# Patient Record
Sex: FEMALE | Race: WHITE | URBAN - METROPOLITAN AREA
[De-identification: names, ages, dates, MRNs, and addresses within clinical notes are randomized per-mention and may not be internally consistent; named-entity substitution may affect disease eponyms.]

---

## 2019-12-31 PROBLEM — Z00.00 ENCOUNTER FOR PREVENTIVE HEALTH EXAMINATION: Status: ACTIVE | Noted: 2019-12-31

## 2021-08-04 ENCOUNTER — OUTPATIENT (OUTPATIENT)
Dept: OUTPATIENT SERVICES | Facility: HOSPITAL | Age: 66
LOS: 1 days | Discharge: HOME | End: 2021-08-04
Payer: COMMERCIAL

## 2021-08-04 DIAGNOSIS — Z12.31 ENCOUNTER FOR SCREENING MAMMOGRAM FOR MALIGNANT NEOPLASM OF BREAST: ICD-10-CM

## 2021-08-04 PROCEDURE — 77063 BREAST TOMOSYNTHESIS BI: CPT | Mod: 26

## 2021-08-04 PROCEDURE — 77067 SCR MAMMO BI INCL CAD: CPT | Mod: 26

## 2022-03-31 ENCOUNTER — OUTPATIENT (OUTPATIENT)
Dept: OUTPATIENT SERVICES | Facility: HOSPITAL | Age: 67
LOS: 1 days | Discharge: HOME | End: 2022-03-31

## 2022-03-31 VITALS
WEIGHT: 199.96 LBS | TEMPERATURE: 97 F | DIASTOLIC BLOOD PRESSURE: 80 MMHG | HEART RATE: 69 BPM | OXYGEN SATURATION: 98 % | SYSTOLIC BLOOD PRESSURE: 161 MMHG | HEIGHT: 64 IN | RESPIRATION RATE: 17 BRPM

## 2022-03-31 VITALS
DIASTOLIC BLOOD PRESSURE: 80 MMHG | OXYGEN SATURATION: 98 % | RESPIRATION RATE: 18 BRPM | HEART RATE: 72 BPM | SYSTOLIC BLOOD PRESSURE: 129 MMHG

## 2022-03-31 DIAGNOSIS — Z98.891 HISTORY OF UTERINE SCAR FROM PREVIOUS SURGERY: Chronic | ICD-10-CM

## 2022-03-31 DIAGNOSIS — Z87.828 PERSONAL HISTORY OF OTHER (HEALED) PHYSICAL INJURY AND TRAUMA: Chronic | ICD-10-CM

## 2022-03-31 NOTE — ASU DISCHARGE PLAN (ADULT/PEDIATRIC) - NS MD DC FALL RISK RISK
For information on Fall & Injury Prevention, visit: [email or web address] OR  [email or web address] OR  https://www.cdc.gov/steadi/patient.html

## 2022-03-31 NOTE — PRE-ANESTHESIA EVALUATION ADULT - NSANTHOSAYNRD_GEN_A_CORE
No. YURIDIA screening performed.  STOP BANG Legend: 0-2 = LOW Risk; 3-4 = INTERMEDIATE Risk; 5-8 = HIGH Risk

## 2022-03-31 NOTE — ASU PATIENT PROFILE, ADULT - NSICDXPASTMEDICALHX_GEN_ALL_CORE_FT
PAST MEDICAL HISTORY:  Hyperlipidemia     Hypertension      PAST MEDICAL HISTORY:  Exposure to COVID-19 virus     Hyperlipidemia     Hypertension

## 2022-04-06 DIAGNOSIS — H25.11 AGE-RELATED NUCLEAR CATARACT, RIGHT EYE: ICD-10-CM

## 2022-04-06 DIAGNOSIS — K21.9 GASTRO-ESOPHAGEAL REFLUX DISEASE WITHOUT ESOPHAGITIS: ICD-10-CM

## 2022-04-06 DIAGNOSIS — I10 ESSENTIAL (PRIMARY) HYPERTENSION: ICD-10-CM

## 2022-04-07 ENCOUNTER — OUTPATIENT (OUTPATIENT)
Dept: OUTPATIENT SERVICES | Facility: HOSPITAL | Age: 67
LOS: 1 days | Discharge: HOME | End: 2022-04-07

## 2022-04-07 VITALS
HEART RATE: 68 BPM | DIASTOLIC BLOOD PRESSURE: 62 MMHG | HEIGHT: 64 IN | RESPIRATION RATE: 18 BRPM | SYSTOLIC BLOOD PRESSURE: 141 MMHG | TEMPERATURE: 98 F | OXYGEN SATURATION: 99 % | WEIGHT: 199.96 LBS

## 2022-04-07 VITALS
DIASTOLIC BLOOD PRESSURE: 71 MMHG | OXYGEN SATURATION: 99 % | HEART RATE: 71 BPM | SYSTOLIC BLOOD PRESSURE: 150 MMHG | RESPIRATION RATE: 18 BRPM

## 2022-04-07 DIAGNOSIS — Z98.891 HISTORY OF UTERINE SCAR FROM PREVIOUS SURGERY: Chronic | ICD-10-CM

## 2022-04-07 DIAGNOSIS — Z87.828 PERSONAL HISTORY OF OTHER (HEALED) PHYSICAL INJURY AND TRAUMA: Chronic | ICD-10-CM

## 2022-04-07 RX ORDER — LANSOPRAZOLE 15 MG/1
40 CAPSULE, DELAYED RELEASE ORAL
Qty: 0 | Refills: 0 | DISCHARGE

## 2022-04-07 RX ORDER — LOSARTAN POTASSIUM 100 MG/1
1 TABLET, FILM COATED ORAL
Qty: 0 | Refills: 0 | DISCHARGE

## 2022-04-07 NOTE — ASU PATIENT PROFILE, ADULT - FALL HARM RISK - UNIVERSAL INTERVENTIONS
Bed in lowest position, wheels locked, appropriate side rails in place/Call bell, personal items and telephone in reach/Instruct patient to call for assistance before getting out of bed or chair/Non-slip footwear when patient is out of bed/[place] to call system/Physically safe environment - no spills, clutter or unnecessary equipment/Purposeful Proactive Rounding/Room/bathroom lighting operational, light cord in reach

## 2022-04-07 NOTE — PACU DISCHARGE NOTE - AIRWAY PATENCY:
[de-identified] : Patient is a 52 year female, with PMH right breast cancer and BRCA1 mutation diagnosed in 2018 s/p b/l mastectomy and breast implants, ANIVAL/BSO, who presents for colon cancer screening. Patient has not had a colonoscopy in the past. Patient unsure about family h/o colon polyps or colon cancer. \par \par Patient found to have iron deficiency with hematology and referred to GI for evaluation. Hgb stable at 14.3. \par \par Patient overall feeling well, she does mention constipation which is worse since starting iron oral supplement. She is currently taking iron orally QOD instead of QD with some relief of constipation. She has some faint bright red blood when she wipes, particularly when straining. \par \par Patient denies pyrosis, dysphagia, nausea, vomiting, or unexplained weight loss. \par \par Patient denies any significant cardiac or pulmonary conditions.\par \par Recent labs done by hematology \par  Satisfactory

## 2022-04-13 DIAGNOSIS — Z98.41 CATARACT EXTRACTION STATUS, RIGHT EYE: ICD-10-CM

## 2022-04-13 DIAGNOSIS — E78.5 HYPERLIPIDEMIA, UNSPECIFIED: ICD-10-CM

## 2022-04-13 DIAGNOSIS — I10 ESSENTIAL (PRIMARY) HYPERTENSION: ICD-10-CM

## 2022-04-13 DIAGNOSIS — H26.8 OTHER SPECIFIED CATARACT: ICD-10-CM

## 2022-04-13 DIAGNOSIS — K21.9 GASTRO-ESOPHAGEAL REFLUX DISEASE WITHOUT ESOPHAGITIS: ICD-10-CM

## 2022-04-13 DIAGNOSIS — Z96.1 PRESENCE OF INTRAOCULAR LENS: ICD-10-CM

## 2022-06-28 PROBLEM — I10 ESSENTIAL (PRIMARY) HYPERTENSION: Chronic | Status: ACTIVE | Noted: 2022-03-31

## 2022-06-28 PROBLEM — Z20.822 CONTACT WITH AND (SUSPECTED) EXPOSURE TO COVID-19: Chronic | Status: ACTIVE | Noted: 2022-03-31

## 2022-06-28 PROBLEM — E78.5 HYPERLIPIDEMIA, UNSPECIFIED: Chronic | Status: ACTIVE | Noted: 2022-03-31

## 2022-07-05 ENCOUNTER — APPOINTMENT (OUTPATIENT)
Dept: ORTHOPEDIC SURGERY | Facility: CLINIC | Age: 67
End: 2022-07-05

## 2022-07-07 ENCOUNTER — APPOINTMENT (OUTPATIENT)
Dept: ORTHOPEDIC SURGERY | Facility: CLINIC | Age: 67
End: 2022-07-07

## 2023-03-01 ENCOUNTER — APPOINTMENT (OUTPATIENT)
Dept: ORTHOPEDIC SURGERY | Facility: CLINIC | Age: 68
End: 2023-03-01

## 2023-03-17 ENCOUNTER — APPOINTMENT (OUTPATIENT)
Dept: ORTHOPEDIC SURGERY | Facility: CLINIC | Age: 68
End: 2023-03-17
Payer: COMMERCIAL

## 2023-03-17 DIAGNOSIS — M51.36 OTHER INTERVERTEBRAL DISC DEGENERATION, LUMBAR REGION: ICD-10-CM

## 2023-03-17 PROCEDURE — 99203 OFFICE O/P NEW LOW 30 MIN: CPT

## 2023-03-17 PROCEDURE — 72110 X-RAY EXAM L-2 SPINE 4/>VWS: CPT

## 2023-03-17 NOTE — PHYSICAL EXAM
[de-identified] : TTP midline spine and paraspinal musculature \par Strength                                         \par Hip flexor\par   Right: 5/5; Left: 5/5                             \par Knee extensor  \par   Right: 5/5; Left: 5/5                     \par Ankle dorsiflexion\par   Right: 5/5; Left: 5/5                  \par EHL        \par   Right: 5/5; Left: 5/5                                \par Ankle plantarflexion    \par   Right: 5/5; Left: 5/5\par \par Sensation\par L1\par   Right: 2/2; Left: 2/2\par L2\par   Right: 2/2; Left: 2/2\par L3\par   Right: 2/2; Left: 2/2\par L4\par   Right: 2/2; Left: 2/2\par L5\par   Right: 2/2; Left: 2/2\par S1\par   Right: 2/2; Left: 2/2\par \par Reflexes\par Patella\par   Right: 2+; Left 2+\par Achilles\par   Right: 2+; Left 2+\par Clonus\par  Right: absent; L: absent\par

## 2023-03-17 NOTE — DATA REVIEWED
[FreeTextEntry1] : I obtained and reviewed AP lateral flexion-extension lumbar x-rays.  There is L5-S1 disc degeneration.  No instability.

## 2023-03-17 NOTE — HISTORY OF PRESENT ILLNESS
[de-identified] : 67-year-old female presents with 1 month of low back pain.  Denies radiating down her legs.  Denies numbness or tingling anywhere.  Denies loss of bladder bowel appears worse in the mornings.  This happened when she was in [place].  She went to urgent care and they gave her prednisone and muscle relaxant.  Got better at first but then returned.  She has not done physical therapy.  
The patient is a 89y Female complaining of fall.

## 2023-03-17 NOTE — DISCUSSION/SUMMARY
[de-identified] : 67-year-old female with some degenerative disc disease.  I recommend anti-inflammatories and physical therapy.  Follow-up in 6 weeks if continued pain.

## 2023-04-20 ENCOUNTER — OUTPATIENT (OUTPATIENT)
Dept: OUTPATIENT SERVICES | Facility: HOSPITAL | Age: 68
LOS: 1 days | End: 2023-04-20
Payer: COMMERCIAL

## 2023-04-20 DIAGNOSIS — Z87.828 PERSONAL HISTORY OF OTHER (HEALED) PHYSICAL INJURY AND TRAUMA: Chronic | ICD-10-CM

## 2023-04-20 DIAGNOSIS — Z98.891 HISTORY OF UTERINE SCAR FROM PREVIOUS SURGERY: Chronic | ICD-10-CM

## 2023-04-20 DIAGNOSIS — R06.2 WHEEZING: ICD-10-CM

## 2023-04-20 PROCEDURE — 71046 X-RAY EXAM CHEST 2 VIEWS: CPT | Mod: 26

## 2023-04-20 PROCEDURE — 71046 X-RAY EXAM CHEST 2 VIEWS: CPT

## 2023-04-21 DIAGNOSIS — R06.2 WHEEZING: ICD-10-CM

## 2023-08-22 ENCOUNTER — OUTPATIENT (OUTPATIENT)
Dept: OUTPATIENT SERVICES | Facility: HOSPITAL | Age: 68
LOS: 1 days | End: 2023-08-22
Payer: COMMERCIAL

## 2023-08-22 DIAGNOSIS — Z12.31 ENCOUNTER FOR SCREENING MAMMOGRAM FOR MALIGNANT NEOPLASM OF BREAST: ICD-10-CM

## 2023-08-22 DIAGNOSIS — Z98.891 HISTORY OF UTERINE SCAR FROM PREVIOUS SURGERY: Chronic | ICD-10-CM

## 2023-08-22 DIAGNOSIS — Z87.828 PERSONAL HISTORY OF OTHER (HEALED) PHYSICAL INJURY AND TRAUMA: Chronic | ICD-10-CM

## 2023-08-22 PROCEDURE — 77063 BREAST TOMOSYNTHESIS BI: CPT | Mod: 26

## 2023-08-22 PROCEDURE — 77067 SCR MAMMO BI INCL CAD: CPT | Mod: 26

## 2023-08-22 PROCEDURE — 77063 BREAST TOMOSYNTHESIS BI: CPT

## 2023-08-22 PROCEDURE — 77067 SCR MAMMO BI INCL CAD: CPT

## 2023-08-23 ENCOUNTER — APPOINTMENT (OUTPATIENT)
Dept: SURGERY | Facility: CLINIC | Age: 68
End: 2023-08-23

## 2023-08-23 DIAGNOSIS — Z12.31 ENCOUNTER FOR SCREENING MAMMOGRAM FOR MALIGNANT NEOPLASM OF BREAST: ICD-10-CM

## 2023-09-15 ENCOUNTER — APPOINTMENT (OUTPATIENT)
Dept: SURGERY | Facility: CLINIC | Age: 68
End: 2023-09-15
Payer: COMMERCIAL

## 2023-09-15 VITALS
HEART RATE: 65 BPM | DIASTOLIC BLOOD PRESSURE: 80 MMHG | HEIGHT: 64 IN | SYSTOLIC BLOOD PRESSURE: 122 MMHG | BODY MASS INDEX: 33.8 KG/M2 | WEIGHT: 198 LBS | TEMPERATURE: 96.4 F | OXYGEN SATURATION: 99 %

## 2023-09-15 DIAGNOSIS — E78.5 HYPERLIPIDEMIA, UNSPECIFIED: ICD-10-CM

## 2023-09-15 DIAGNOSIS — I10 ESSENTIAL (PRIMARY) HYPERTENSION: ICD-10-CM

## 2023-09-15 DIAGNOSIS — K21.9 GASTRO-ESOPHAGEAL REFLUX DISEASE W/OUT ESOPHAGITIS: ICD-10-CM

## 2023-09-15 PROCEDURE — 21555 EXC NECK LES SC < 3 CM: CPT

## 2023-09-20 PROBLEM — I10 HTN (HYPERTENSION): Status: ACTIVE | Noted: 2023-09-15

## 2023-09-20 PROBLEM — E78.5 HYPERLIPIDEMIA: Status: ACTIVE | Noted: 2023-09-15

## 2023-09-20 PROBLEM — K21.9 GERD (GASTROESOPHAGEAL REFLUX DISEASE): Status: ACTIVE | Noted: 2023-09-15

## 2023-09-20 RX ORDER — MELOXICAM 15 MG/1
15 TABLET ORAL
Qty: 90 | Refills: 1 | Status: DISCONTINUED | COMMUNITY
Start: 2023-03-17 | End: 2023-09-20

## 2023-09-20 RX ORDER — LOSARTAN POTASSIUM 100 MG/1
TABLET, FILM COATED ORAL
Refills: 0 | Status: ACTIVE | COMMUNITY

## 2023-09-20 RX ORDER — PRAVASTATIN SODIUM 80 MG/1
TABLET ORAL
Refills: 0 | Status: ACTIVE | COMMUNITY

## 2023-09-20 RX ORDER — OLMESARTAN MEDOXOMIL AND HYDROCHLOROTHIAZIDE 40; 25 MG/1; MG/1
TABLET ORAL
Refills: 0 | Status: ACTIVE | COMMUNITY

## 2023-09-20 RX ORDER — OMEPRAZOLE 40 MG/1
40 CAPSULE, DELAYED RELEASE ORAL
Refills: 0 | Status: ACTIVE | COMMUNITY

## 2023-09-29 ENCOUNTER — APPOINTMENT (OUTPATIENT)
Dept: SURGERY | Facility: CLINIC | Age: 68
End: 2023-09-29
Payer: COMMERCIAL

## 2023-09-29 VITALS
HEART RATE: 74 BPM | OXYGEN SATURATION: 98 % | TEMPERATURE: 97.5 F | BODY MASS INDEX: 33.8 KG/M2 | HEIGHT: 64 IN | WEIGHT: 198 LBS | SYSTOLIC BLOOD PRESSURE: 122 MMHG | DIASTOLIC BLOOD PRESSURE: 70 MMHG

## 2023-09-29 DIAGNOSIS — B37.31 ACUTE CANDIDIASIS OF VULVA AND VAGINA: ICD-10-CM

## 2023-09-29 PROCEDURE — 99024 POSTOP FOLLOW-UP VISIT: CPT

## 2023-09-29 RX ORDER — FLUCONAZOLE 150 MG/1
150 TABLET ORAL
Qty: 1 | Refills: 1 | Status: ACTIVE | COMMUNITY
Start: 2023-09-29 | End: 1900-01-01

## 2023-10-05 ENCOUNTER — APPOINTMENT (OUTPATIENT)
Dept: SURGERY | Facility: CLINIC | Age: 68
End: 2023-10-05
Payer: COMMERCIAL

## 2023-10-05 VITALS
OXYGEN SATURATION: 95 % | DIASTOLIC BLOOD PRESSURE: 82 MMHG | BODY MASS INDEX: 33.8 KG/M2 | HEIGHT: 64 IN | HEART RATE: 100 BPM | WEIGHT: 198 LBS | SYSTOLIC BLOOD PRESSURE: 136 MMHG | TEMPERATURE: 97 F

## 2023-10-05 DIAGNOSIS — L72.3 SEBACEOUS CYST: ICD-10-CM

## 2023-10-05 PROCEDURE — 99024 POSTOP FOLLOW-UP VISIT: CPT

## 2023-10-06 ENCOUNTER — APPOINTMENT (OUTPATIENT)
Dept: SURGERY | Facility: CLINIC | Age: 68
End: 2023-10-06

## 2023-10-10 ENCOUNTER — APPOINTMENT (OUTPATIENT)
Dept: ORTHOPEDIC SURGERY | Facility: CLINIC | Age: 68
End: 2023-10-10
Payer: COMMERCIAL

## 2023-10-10 VITALS — BODY MASS INDEX: 33.29 KG/M2 | HEIGHT: 64 IN | WEIGHT: 195 LBS

## 2023-10-10 DIAGNOSIS — M75.02 ADHESIVE CAPSULITIS OF LEFT SHOULDER: ICD-10-CM

## 2023-10-10 PROCEDURE — 73030 X-RAY EXAM OF SHOULDER: CPT | Mod: LT

## 2023-10-10 PROCEDURE — 20611 DRAIN/INJ JOINT/BURSA W/US: CPT | Mod: LT

## 2023-10-10 PROCEDURE — 99213 OFFICE O/P EST LOW 20 MIN: CPT | Mod: 25

## 2023-10-27 ENCOUNTER — APPOINTMENT (OUTPATIENT)
Dept: SURGERY | Facility: CLINIC | Age: 68
End: 2023-10-27
Payer: COMMERCIAL

## 2023-10-27 VITALS
TEMPERATURE: 96 F | SYSTOLIC BLOOD PRESSURE: 126 MMHG | HEART RATE: 91 BPM | DIASTOLIC BLOOD PRESSURE: 82 MMHG | WEIGHT: 195 LBS | BODY MASS INDEX: 33.29 KG/M2 | OXYGEN SATURATION: 97 % | HEIGHT: 64 IN

## 2023-10-27 DIAGNOSIS — L72.3 SEBACEOUS CYST: ICD-10-CM

## 2023-10-27 PROCEDURE — 99024 POSTOP FOLLOW-UP VISIT: CPT

## 2023-10-27 RX ORDER — CLINDAMYCIN HYDROCHLORIDE 150 MG/1
150 CAPSULE ORAL EVERY 6 HOURS
Qty: 28 | Refills: 0 | Status: DISCONTINUED | COMMUNITY
Start: 2023-09-29 | End: 2023-10-27

## 2023-11-03 ENCOUNTER — NON-APPOINTMENT (OUTPATIENT)
Age: 68
End: 2023-11-03

## 2023-11-17 ENCOUNTER — APPOINTMENT (OUTPATIENT)
Dept: SURGERY | Facility: CLINIC | Age: 68
End: 2023-11-17
Payer: COMMERCIAL

## 2023-11-17 VITALS
SYSTOLIC BLOOD PRESSURE: 124 MMHG | HEART RATE: 85 BPM | DIASTOLIC BLOOD PRESSURE: 80 MMHG | HEIGHT: 64 IN | BODY MASS INDEX: 32.44 KG/M2 | OXYGEN SATURATION: 98 % | WEIGHT: 190 LBS

## 2023-11-17 DIAGNOSIS — Z87.2 PERSONAL HISTORY OF DISEASES OF THE SKIN AND SUBCUTANEOUS TISSUE: ICD-10-CM

## 2023-11-17 PROCEDURE — 99024 POSTOP FOLLOW-UP VISIT: CPT

## 2023-12-11 ENCOUNTER — APPOINTMENT (OUTPATIENT)
Dept: ORTHOPEDIC SURGERY | Facility: CLINIC | Age: 68
End: 2023-12-11
Payer: COMMERCIAL

## 2023-12-11 VITALS — WEIGHT: 190 LBS | HEIGHT: 64 IN | BODY MASS INDEX: 32.44 KG/M2

## 2023-12-11 DIAGNOSIS — M75.02 ADHESIVE CAPSULITIS OF LEFT SHOULDER: ICD-10-CM

## 2023-12-11 PROCEDURE — 99213 OFFICE O/P EST LOW 20 MIN: CPT | Mod: 25

## 2023-12-11 PROCEDURE — 20611 DRAIN/INJ JOINT/BURSA W/US: CPT | Mod: LT

## 2024-01-05 ENCOUNTER — TRANSCRIPTION ENCOUNTER (OUTPATIENT)
Age: 69
End: 2024-01-05

## 2024-02-13 ENCOUNTER — APPOINTMENT (OUTPATIENT)
Dept: ORTHOPEDIC SURGERY | Facility: CLINIC | Age: 69
End: 2024-02-13

## 2024-04-26 ENCOUNTER — APPOINTMENT (OUTPATIENT)
Age: 69
End: 2024-04-26
Payer: COMMERCIAL

## 2024-04-26 ENCOUNTER — OUTPATIENT (OUTPATIENT)
Dept: OUTPATIENT SERVICES | Facility: HOSPITAL | Age: 69
LOS: 1 days | End: 2024-04-26
Payer: COMMERCIAL

## 2024-04-26 DIAGNOSIS — Z87.828 PERSONAL HISTORY OF OTHER (HEALED) PHYSICAL INJURY AND TRAUMA: Chronic | ICD-10-CM

## 2024-04-26 DIAGNOSIS — Z98.891 HISTORY OF UTERINE SCAR FROM PREVIOUS SURGERY: Chronic | ICD-10-CM

## 2024-04-26 DIAGNOSIS — R06.02 SHORTNESS OF BREATH: ICD-10-CM

## 2024-04-26 PROCEDURE — 94727 GAS DIL/WSHOT DETER LNG VOL: CPT | Mod: 26

## 2024-04-26 PROCEDURE — 94729 DIFFUSING CAPACITY: CPT | Mod: 26

## 2024-04-26 PROCEDURE — 94664 DEMO&/EVAL PT USE INHALER: CPT

## 2024-04-26 PROCEDURE — 94060 EVALUATION OF WHEEZING: CPT | Mod: 26

## 2024-04-26 PROCEDURE — 94729 DIFFUSING CAPACITY: CPT

## 2024-04-26 PROCEDURE — 94727 GAS DIL/WSHOT DETER LNG VOL: CPT

## 2024-04-26 PROCEDURE — 94070 EVALUATION OF WHEEZING: CPT

## 2024-04-27 DIAGNOSIS — R06.02 SHORTNESS OF BREATH: ICD-10-CM

## 2024-07-09 ENCOUNTER — OUTPATIENT (OUTPATIENT)
Dept: OUTPATIENT SERVICES | Facility: HOSPITAL | Age: 69
LOS: 1 days | End: 2024-07-09
Payer: COMMERCIAL

## 2024-07-09 DIAGNOSIS — I25.10 ATHEROSCLEROTIC HEART DISEASE OF NATIVE CORONARY ARTERY WITHOUT ANGINA PECTORIS: ICD-10-CM

## 2024-07-09 DIAGNOSIS — Z00.8 ENCOUNTER FOR OTHER GENERAL EXAMINATION: ICD-10-CM

## 2024-07-09 DIAGNOSIS — Z87.828 PERSONAL HISTORY OF OTHER (HEALED) PHYSICAL INJURY AND TRAUMA: Chronic | ICD-10-CM

## 2024-07-09 DIAGNOSIS — Z98.891 HISTORY OF UTERINE SCAR FROM PREVIOUS SURGERY: Chronic | ICD-10-CM

## 2024-07-09 PROCEDURE — 75574 CT ANGIO HRT W/3D IMAGE: CPT | Mod: 26

## 2024-07-09 PROCEDURE — 75574 CT ANGIO HRT W/3D IMAGE: CPT

## 2024-07-10 DIAGNOSIS — I25.10 ATHEROSCLEROTIC HEART DISEASE OF NATIVE CORONARY ARTERY WITHOUT ANGINA PECTORIS: ICD-10-CM

## 2024-07-23 ENCOUNTER — APPOINTMENT (OUTPATIENT)
Dept: PULMONOLOGY | Facility: CLINIC | Age: 69
End: 2024-07-23
Payer: COMMERCIAL

## 2024-07-23 VITALS
SYSTOLIC BLOOD PRESSURE: 130 MMHG | HEIGHT: 64 IN | BODY MASS INDEX: 34.15 KG/M2 | DIASTOLIC BLOOD PRESSURE: 78 MMHG | HEART RATE: 63 BPM | OXYGEN SATURATION: 99 % | WEIGHT: 200 LBS

## 2024-07-23 DIAGNOSIS — R05.3 CHRONIC COUGH: ICD-10-CM

## 2024-07-23 DIAGNOSIS — R91.1 SOLITARY PULMONARY NODULE: ICD-10-CM

## 2024-07-23 DIAGNOSIS — K21.9 GASTRO-ESOPHAGEAL REFLUX DISEASE W/OUT ESOPHAGITIS: ICD-10-CM

## 2024-07-23 PROCEDURE — 99204 OFFICE O/P NEW MOD 45 MIN: CPT

## 2024-07-23 RX ORDER — AZELASTINE HYDROCHLORIDE 137 UG/1
0.1 SPRAY, METERED NASAL
Refills: 0 | Status: ACTIVE | COMMUNITY

## 2024-07-23 RX ORDER — BUDESONIDE AND FORMOTEROL FUMARATE DIHYDRATE 160; 4.5 UG/1; UG/1
160-4.5 AEROSOL RESPIRATORY (INHALATION) TWICE DAILY
Qty: 1 | Refills: 1 | Status: ACTIVE | COMMUNITY
Start: 2024-07-23 | End: 1900-01-01

## 2024-07-23 RX ORDER — FLUTICASONE FUROATE, UMECLIDINIUM BROMIDE AND VILANTEROL TRIFENATATE 100; 62.5; 25 UG/1; UG/1; UG/1
100-62.5-25 POWDER RESPIRATORY (INHALATION)
Refills: 0 | Status: ACTIVE | COMMUNITY

## 2024-07-23 RX ORDER — PREDNISONE 20 MG/1
20 TABLET ORAL DAILY
Qty: 15 | Refills: 0 | Status: ACTIVE | COMMUNITY
Start: 2024-07-23 | End: 1900-01-01

## 2024-07-23 RX ORDER — FLUTICASONE PROPIONATE 50 UG/1
50 SPRAY, METERED NASAL TWICE DAILY
Qty: 1 | Refills: 1 | Status: ACTIVE | COMMUNITY
Start: 2024-07-23 | End: 1900-01-01

## 2024-07-23 NOTE — HISTORY OF PRESENT ILLNESS
[TextBox_4] : 68 year old female former smoker of 20 PY  Quit 20 years ago  Presenting for chronic cough  Cough was occurring throughout the day but now mostly at night  PFTs done: No obstruction, mild restriction likely related to body habitus, mild DLCO reduction that corrects to the VA  CCTA also done- 5 mm nodule - needs CT in 1 year given smoking history - no parenchymal dx noted  She has GERD, post nasal drip which can contribute - she feels post nasal drip is not controlled  She has HTN but is not on ACE inhibitors

## 2024-07-23 NOTE — REVIEW OF SYSTEMS
[Nasal Congestion] : nasal congestion [Postnasal Drip] : postnasal drip [Cough] : cough [Sputum] : sputum [Negative] : Endocrine [Dyspnea] : no dyspnea

## 2024-07-23 NOTE — ASSESSMENT
[FreeTextEntry1] : Chronic cough  GERD - well controlled  Possible asthma - Start Symbicort 160 BID - DC trelegy  Post nasal drip not controlled - continue Azelastine nasal spray - add Flonase BID  Prednisone also given  Recent CXR normal  PFTs with no obstruction; restriction likely body habitus related   5 mm lung nodule on CCTA  Former smoker  CT chest in 1 year   1 month follow up

## 2024-08-09 ENCOUNTER — APPOINTMENT (OUTPATIENT)
Dept: SLEEP CENTER | Facility: HOSPITAL | Age: 69
End: 2024-08-09

## 2024-08-09 ENCOUNTER — OUTPATIENT (OUTPATIENT)
Dept: OUTPATIENT SERVICES | Facility: HOSPITAL | Age: 69
LOS: 1 days | Discharge: HOME | End: 2024-08-09
Payer: COMMERCIAL

## 2024-08-09 DIAGNOSIS — G47.33 OBSTRUCTIVE SLEEP APNEA (ADULT) (PEDIATRIC): ICD-10-CM

## 2024-08-09 DIAGNOSIS — Z98.891 HISTORY OF UTERINE SCAR FROM PREVIOUS SURGERY: Chronic | ICD-10-CM

## 2024-08-09 DIAGNOSIS — Z87.828 PERSONAL HISTORY OF OTHER (HEALED) PHYSICAL INJURY AND TRAUMA: Chronic | ICD-10-CM

## 2024-08-09 PROCEDURE — 95800 SLP STDY UNATTENDED: CPT | Mod: 26

## 2024-08-09 PROCEDURE — 95800 SLP STDY UNATTENDED: CPT

## 2024-08-13 DIAGNOSIS — G47.33 OBSTRUCTIVE SLEEP APNEA (ADULT) (PEDIATRIC): ICD-10-CM

## 2024-08-14 ENCOUNTER — APPOINTMENT (OUTPATIENT)
Dept: PULMONOLOGY | Facility: CLINIC | Age: 69
End: 2024-08-14

## 2024-08-27 ENCOUNTER — APPOINTMENT (OUTPATIENT)
Dept: PULMONOLOGY | Facility: CLINIC | Age: 69
End: 2024-08-27
Payer: COMMERCIAL

## 2024-08-27 VITALS
WEIGHT: 200 LBS | RESPIRATION RATE: 14 BRPM | SYSTOLIC BLOOD PRESSURE: 120 MMHG | HEIGHT: 64 IN | OXYGEN SATURATION: 99 % | HEART RATE: 81 BPM | BODY MASS INDEX: 34.15 KG/M2 | DIASTOLIC BLOOD PRESSURE: 80 MMHG

## 2024-08-27 DIAGNOSIS — G47.33 OBSTRUCTIVE SLEEP APNEA (ADULT) (PEDIATRIC): ICD-10-CM

## 2024-08-27 DIAGNOSIS — R09.82 POSTNASAL DRIP: ICD-10-CM

## 2024-08-27 DIAGNOSIS — R91.1 SOLITARY PULMONARY NODULE: ICD-10-CM

## 2024-08-27 DIAGNOSIS — R05.3 CHRONIC COUGH: ICD-10-CM

## 2024-08-27 PROCEDURE — 99214 OFFICE O/P EST MOD 30 MIN: CPT

## 2024-08-27 PROCEDURE — G2211 COMPLEX E/M VISIT ADD ON: CPT | Mod: NC

## 2024-08-27 NOTE — ASSESSMENT
[FreeTextEntry1] : Chronic cough  GERD - well controlled  Possible HAAD - switch Symbicort to PRN  Post nasal drip - continue Azelastine BID - can use PRN  Recent CXR normal  PFTs with no obstruction; restriction likely body habitus related   5 mm lung nodule on CCTA  Former smoker  CT chest NC given  Not yet sone   Severe YURIDIA  APAP 6-18 ordered   3 months follow up

## 2024-08-27 NOTE — PHYSICAL EXAM
[No Acute Distress] : no acute distress [Normal Oropharynx] : normal oropharynx [Normal Appearance] : normal appearance [No Neck Mass] : no neck mass [Normal Rate/Rhythm] : normal rate/rhythm [Normal S1, S2] : normal s1, s2 [No Murmurs] : no murmurs [Wheeze] : wheeze [No Abnormalities] : no abnormalities [Benign] : benign [Normal Gait] : normal gait [No Clubbing] : no clubbing [No Cyanosis] : no cyanosis [No Edema] : no edema [FROM] : FROM [Normal Color/ Pigmentation] : normal color/ pigmentation [No Focal Deficits] : no focal deficits [Normal Affect] : normal affect [Oriented x3] : oriented x3

## 2024-08-27 NOTE — REVIEW OF SYSTEMS
[Postnasal Drip] : postnasal drip [Negative] : Endocrine [Nasal Congestion] : no nasal congestion [Cough] : no cough [Sputum] : no sputum [Dyspnea] : no dyspnea [TextBox_30] : resolved

## 2024-08-27 NOTE — HISTORY OF PRESENT ILLNESS
[TextBox_4] : 68 year old female former smoker of 20 PY  Quit 20 years ago  Presenting for chronic cough  Cough was occurring throughout the day but now mostly at night  PFTs done: No obstruction, mild restriction likely related to body habitus, mild DLCO reduction that corrects to the VA  CCTA also done- 5 mm nodule - needs CT in 1 year given smoking history - no parenchymal dx noted  She has GERD, post nasal drip which can contribute - she feels post nasal drip is not controlled  She has HTN but is not on ACE inhibitors   Cough resolved, no wheezing on exam today, PFTs and CT discussed; Severe YURIDIA on testing; APAP ordered.

## 2024-09-26 ENCOUNTER — RESULT REVIEW (OUTPATIENT)
Age: 69
End: 2024-09-26

## 2024-09-26 ENCOUNTER — OUTPATIENT (OUTPATIENT)
Dept: OUTPATIENT SERVICES | Facility: HOSPITAL | Age: 69
LOS: 1 days | Discharge: HOME | End: 2024-09-26
Payer: COMMERCIAL

## 2024-09-26 ENCOUNTER — INPATIENT (INPATIENT)
Facility: HOSPITAL | Age: 69
LOS: 2 days | Discharge: HOME | DRG: 862 | End: 2024-09-29
Attending: INTERNAL MEDICINE | Admitting: STUDENT IN AN ORGANIZED HEALTH CARE EDUCATION/TRAINING PROGRAM
Payer: COMMERCIAL

## 2024-09-26 VITALS
HEART RATE: 64 BPM | TEMPERATURE: 97 F | DIASTOLIC BLOOD PRESSURE: 77 MMHG | OXYGEN SATURATION: 99 % | SYSTOLIC BLOOD PRESSURE: 167 MMHG | HEIGHT: 64 IN | RESPIRATION RATE: 18 BRPM | WEIGHT: 199.96 LBS

## 2024-09-26 VITALS
RESPIRATION RATE: 18 BRPM | DIASTOLIC BLOOD PRESSURE: 78 MMHG | HEIGHT: 64 IN | TEMPERATURE: 99 F | OXYGEN SATURATION: 98 % | HEART RATE: 125 BPM | SYSTOLIC BLOOD PRESSURE: 143 MMHG | WEIGHT: 199.96 LBS

## 2024-09-26 VITALS
DIASTOLIC BLOOD PRESSURE: 64 MMHG | OXYGEN SATURATION: 98 % | SYSTOLIC BLOOD PRESSURE: 139 MMHG | HEART RATE: 78 BPM | RESPIRATION RATE: 18 BRPM

## 2024-09-26 DIAGNOSIS — Z98.891 HISTORY OF UTERINE SCAR FROM PREVIOUS SURGERY: Chronic | ICD-10-CM

## 2024-09-26 DIAGNOSIS — Z87.828 PERSONAL HISTORY OF OTHER (HEALED) PHYSICAL INJURY AND TRAUMA: Chronic | ICD-10-CM

## 2024-09-26 DIAGNOSIS — K29.00 ACUTE GASTRITIS WITHOUT BLEEDING: ICD-10-CM

## 2024-09-26 DIAGNOSIS — D13.0 BENIGN NEOPLASM OF ESOPHAGUS: ICD-10-CM

## 2024-09-26 LAB
BASOPHILS # BLD AUTO: 0.05 K/UL — SIGNIFICANT CHANGE UP (ref 0–0.2)
BASOPHILS NFR BLD AUTO: 0.4 % — SIGNIFICANT CHANGE UP (ref 0–1)
EOSINOPHIL # BLD AUTO: 0.04 K/UL — SIGNIFICANT CHANGE UP (ref 0–0.7)
EOSINOPHIL NFR BLD AUTO: 0.3 % — SIGNIFICANT CHANGE UP (ref 0–8)
HCT VFR BLD CALC: 36.1 % — LOW (ref 37–47)
HGB BLD-MCNC: 12 G/DL — SIGNIFICANT CHANGE UP (ref 12–16)
IMM GRANULOCYTES NFR BLD AUTO: 0.4 % — HIGH (ref 0.1–0.3)
LYMPHOCYTES # BLD AUTO: 0.85 K/UL — LOW (ref 1.2–3.4)
LYMPHOCYTES # BLD AUTO: 7 % — LOW (ref 20.5–51.1)
MCHC RBC-ENTMCNC: 29.3 PG — SIGNIFICANT CHANGE UP (ref 27–31)
MCHC RBC-ENTMCNC: 33.2 G/DL — SIGNIFICANT CHANGE UP (ref 32–37)
MCV RBC AUTO: 88.3 FL — SIGNIFICANT CHANGE UP (ref 81–99)
MONOCYTES # BLD AUTO: 0.51 K/UL — SIGNIFICANT CHANGE UP (ref 0.1–0.6)
MONOCYTES NFR BLD AUTO: 4.2 % — SIGNIFICANT CHANGE UP (ref 1.7–9.3)
NEUTROPHILS # BLD AUTO: 10.67 K/UL — HIGH (ref 1.4–6.5)
NEUTROPHILS NFR BLD AUTO: 87.7 % — HIGH (ref 42.2–75.2)
NRBC # BLD: 0 /100 WBCS — SIGNIFICANT CHANGE UP (ref 0–0)
PLATELET # BLD AUTO: 238 K/UL — SIGNIFICANT CHANGE UP (ref 130–400)
PMV BLD: 11.1 FL — HIGH (ref 7.4–10.4)
RBC # BLD: 4.09 M/UL — LOW (ref 4.2–5.4)
RBC # FLD: 12.4 % — SIGNIFICANT CHANGE UP (ref 11.5–14.5)
WBC # BLD: 12.17 K/UL — HIGH (ref 4.8–10.8)
WBC # FLD AUTO: 12.17 K/UL — HIGH (ref 4.8–10.8)

## 2024-09-26 PROCEDURE — 43251 EGD REMOVE LESION SNARE: CPT

## 2024-09-26 PROCEDURE — 43236 UPPR GI SCOPE W/SUBMUC INJ: CPT | Mod: XU

## 2024-09-26 PROCEDURE — C1889: CPT

## 2024-09-26 PROCEDURE — 88307 TISSUE EXAM BY PATHOLOGIST: CPT

## 2024-09-26 PROCEDURE — 88307 TISSUE EXAM BY PATHOLOGIST: CPT | Mod: 26

## 2024-09-26 PROCEDURE — 99291 CRITICAL CARE FIRST HOUR: CPT

## 2024-09-26 RX ORDER — SODIUM CHLORIDE 0.9 % (FLUSH) 0.9 %
1000 SYRINGE (ML) INJECTION ONCE
Refills: 0 | Status: COMPLETED | OUTPATIENT
Start: 2024-09-26 | End: 2024-09-26

## 2024-09-26 RX ORDER — MORPHINE SULFATE 30 MG/1
4 TABLET, FILM COATED, EXTENDED RELEASE ORAL ONCE
Refills: 0 | Status: DISCONTINUED | OUTPATIENT
Start: 2024-09-26 | End: 2024-09-26

## 2024-09-26 RX ORDER — FAMOTIDINE 40 MG
20 TABLET ORAL ONCE
Refills: 0 | Status: COMPLETED | OUTPATIENT
Start: 2024-09-26 | End: 2024-09-26

## 2024-09-26 RX ADMIN — Medication 1000 MILLILITER(S): at 23:17

## 2024-09-26 RX ADMIN — Medication 20 MILLIGRAM(S): at 23:16

## 2024-09-26 RX ADMIN — MORPHINE SULFATE 4 MILLIGRAM(S): 30 TABLET, FILM COATED, EXTENDED RELEASE ORAL at 23:17

## 2024-09-26 NOTE — H&P PST ADULT - ADMIT DATE
26-Sep-2024
The patient has been re-examined and I agree with the above assessment or I updated with my findings.

## 2024-09-26 NOTE — ED PROVIDER NOTE - PHYSICAL EXAMINATION
Constitutional: Uncomfortable appearing. Non toxic.   Eyes: PERRLA. Extraocular movements intact, no entrapment. Conjunctiva normal.   ENT: No nasal discharge. dry mucus membranes.  Neck: Supple, non tender, full range of motion.  CV: RRR no murmurs, rubs, or gallops. +S1S2.   Pulm: Clear to auscultation bilaterally. Normal work of breathing.  Abd: mildly distended, diffuse upper abdominal ttp. no rebound/guarding   Ext: Warm and well perfused x4, moving all extremities, no edema.   Psy: Cooperative, appropriate.   Skin: Warm, dry, no rash  Neuro: nonfocal

## 2024-09-26 NOTE — ED ADULT NURSE NOTE - NSFALLRISKINTERV_ED_ALL_ED

## 2024-09-26 NOTE — ED PROVIDER NOTE - CLINICAL SUMMARY MEDICAL DECISION MAKING FREE TEXT BOX
69-year-old female history of hypertension GERD dyslipidemia presenting for evaluation of lower abdominal pain.  States that she had EGD this morning, after returning home and eating soup developed diffuse upper abdominal pain.  No nausea vomiting.  No bright red blood per rectum or melena.  History of C-sections, no other abdominal surgeries. Labs imaging reviewed.  Patient subsequently became hypotensive, developed fever.  Cultures drawn, empiric antibiotics given.  Status post GYN evaluation.  GI consulted, recommend n.p.o., PPI, will evaluate in a.m.  Discussed with ICU, will admit to stepdown unit for further evaluation.

## 2024-09-26 NOTE — CHART NOTE - NSCHARTNOTEFT_GEN_A_CORE
PACU ANESTHESIA ADMISSION NOTE      Procedure:   Post op diagnosis:      ____  Intubated  TV:______       Rate: ______      FiO2: ______    __x__  Patent Airway    __x__  Full return of protective reflexes    __x__  Full recovery from anesthesia / back to baseline     Vitals:   T:  97.4         R: 20                 BP: 89/51                 Sat:  100                 P: 90      Mental Status:  __x__ Awake   ___x__ Alert   _____ Drowsy   _____ Sedated    Nausea/Vomiting:  _x___ NO  ______Yes,   See Post - Op Orders          Pain Scale (0-10):  _____    Treatment: __x__ None    ____ See Post - Op/PCA Orders    Post - Operative Fluids:   ____ Oral   ___x_ See Post - Op Orders    Plan: Discharge:   __x__Home       _____Floor     _____Critical Care    _____  Other:_________________    Comments:    Uneventful anesthesia. Patient transported to  spontaneously breathing and hemodynamically stable.

## 2024-09-26 NOTE — ASU DISCHARGE PLAN (ADULT/PEDIATRIC) - CARE PROVIDER_API CALL
Anabel Hernandez  Gastroenterology  [address] [place] [address]  Phone: [phone]  Fax: [phone]  Follow Up Time: 2 weeks

## 2024-09-26 NOTE — ASU PATIENT PROFILE, ADULT - FALL HARM RISK - UNIVERSAL INTERVENTIONS
Bed in lowest position, wheels locked, appropriate side rails in place/Call bell, personal items and telephone in reach/Instruct patient to call for assistance before getting out of bed or chair/Non-slip footwear when patient is out of bed/[place] to call system/Physically safe environment - no spills, clutter or unnecessary equipment/Purposeful Proactive Rounding/Room/bathroom lighting operational, light cord in reach

## 2024-09-26 NOTE — ED ADULT NURSE NOTE - OBJECTIVE STATEMENT
pt presented to ED c/o abdominal pain and heart burn s/p endoscopy procedure for poly removal. since procedure, pt has not been able to pass stool or gas. pt states she feels dehydrated. denies any chest pain or sob. pt feels warm to touch. A&Ox4. denies any bloody vomit or hematuria

## 2024-09-26 NOTE — ED PROVIDER NOTE - OBJECTIVE STATEMENT
69-year-old female history of hypertension GERD dyslipidemia presenting for evaluation of lower abdominal pain.  States that she had EGD this morning, after returning home and eating soup developed diffuse upper abdominal pain.  No nausea vomiting.  No bright red blood per rectum or melena.  History of C-sections, no other abdominal surgeries.

## 2024-09-27 ENCOUNTER — TRANSCRIPTION ENCOUNTER (OUTPATIENT)
Age: 69
End: 2024-09-27

## 2024-09-27 DIAGNOSIS — A41.9 SEPSIS, UNSPECIFIED ORGANISM: ICD-10-CM

## 2024-09-27 LAB
ABO RH CONFIRMATION: SIGNIFICANT CHANGE UP
ALBUMIN SERPL ELPH-MCNC: 3.6 G/DL — SIGNIFICANT CHANGE UP (ref 3.5–5.2)
ALBUMIN SERPL ELPH-MCNC: 4.1 G/DL — SIGNIFICANT CHANGE UP (ref 3.5–5.2)
ALP SERPL-CCNC: 102 U/L — SIGNIFICANT CHANGE UP (ref 30–115)
ALP SERPL-CCNC: 81 U/L — SIGNIFICANT CHANGE UP (ref 30–115)
ALT FLD-CCNC: 16 U/L — SIGNIFICANT CHANGE UP (ref 0–41)
ALT FLD-CCNC: 20 U/L — SIGNIFICANT CHANGE UP (ref 0–41)
ANION GAP SERPL CALC-SCNC: 11 MMOL/L — SIGNIFICANT CHANGE UP (ref 7–14)
ANION GAP SERPL CALC-SCNC: 12 MMOL/L — SIGNIFICANT CHANGE UP (ref 7–14)
ANION GAP SERPL CALC-SCNC: 15 MMOL/L — HIGH (ref 7–14)
APTT BLD: 30.8 SEC — SIGNIFICANT CHANGE UP (ref 27–39.2)
AST SERPL-CCNC: 23 U/L — SIGNIFICANT CHANGE UP (ref 0–41)
AST SERPL-CCNC: 30 U/L — SIGNIFICANT CHANGE UP (ref 0–41)
BASOPHILS # BLD AUTO: 0.05 K/UL — SIGNIFICANT CHANGE UP (ref 0–0.2)
BASOPHILS # BLD AUTO: 0.1 K/UL — SIGNIFICANT CHANGE UP (ref 0–0.2)
BASOPHILS NFR BLD AUTO: 0.4 % — SIGNIFICANT CHANGE UP (ref 0–1)
BASOPHILS NFR BLD AUTO: 0.6 % — SIGNIFICANT CHANGE UP (ref 0–1)
BILIRUB SERPL-MCNC: 0.5 MG/DL — SIGNIFICANT CHANGE UP (ref 0.2–1.2)
BILIRUB SERPL-MCNC: 0.7 MG/DL — SIGNIFICANT CHANGE UP (ref 0.2–1.2)
BLD GP AB SCN SERPL QL: SIGNIFICANT CHANGE UP
BUN SERPL-MCNC: 14 MG/DL — SIGNIFICANT CHANGE UP (ref 10–20)
BUN SERPL-MCNC: 16 MG/DL — SIGNIFICANT CHANGE UP (ref 10–20)
BUN SERPL-MCNC: 24 MG/DL — HIGH (ref 10–20)
CALCIUM SERPL-MCNC: 8.5 MG/DL — SIGNIFICANT CHANGE UP (ref 8.4–10.4)
CALCIUM SERPL-MCNC: 8.7 MG/DL — SIGNIFICANT CHANGE UP (ref 8.4–10.5)
CALCIUM SERPL-MCNC: 9.5 MG/DL — SIGNIFICANT CHANGE UP (ref 8.4–10.5)
CANCER AG125 SERPL-ACNC: 15 U/ML — SIGNIFICANT CHANGE UP
CANCER AG19-9 SERPL-ACNC: 25 U/ML — SIGNIFICANT CHANGE UP
CEA SERPL-MCNC: 1.9 NG/ML — SIGNIFICANT CHANGE UP (ref 0–3.8)
CHLORIDE SERPL-SCNC: 106 MMOL/L — SIGNIFICANT CHANGE UP (ref 98–110)
CHLORIDE SERPL-SCNC: 106 MMOL/L — SIGNIFICANT CHANGE UP (ref 98–110)
CHLORIDE SERPL-SCNC: 108 MMOL/L — SIGNIFICANT CHANGE UP (ref 98–110)
CO2 SERPL-SCNC: 20 MMOL/L — SIGNIFICANT CHANGE UP (ref 17–32)
CO2 SERPL-SCNC: 22 MMOL/L — SIGNIFICANT CHANGE UP (ref 17–32)
CO2 SERPL-SCNC: 23 MMOL/L — SIGNIFICANT CHANGE UP (ref 17–32)
CREAT SERPL-MCNC: 1.2 MG/DL — SIGNIFICANT CHANGE UP (ref 0.7–1.5)
CREAT SERPL-MCNC: 1.2 MG/DL — SIGNIFICANT CHANGE UP (ref 0.7–1.5)
CREAT SERPL-MCNC: 1.4 MG/DL — SIGNIFICANT CHANGE UP (ref 0.7–1.5)
EGFR: 41 ML/MIN/1.73M2 — LOW
EGFR: 50 ML/MIN/1.73M2 — LOW
EGFR: 50 ML/MIN/1.73M2 — LOW
EOSINOPHIL # BLD AUTO: 0.07 K/UL — SIGNIFICANT CHANGE UP (ref 0–0.7)
EOSINOPHIL # BLD AUTO: 0.13 K/UL — SIGNIFICANT CHANGE UP (ref 0–0.7)
EOSINOPHIL NFR BLD AUTO: 0.4 % — SIGNIFICANT CHANGE UP (ref 0–8)
EOSINOPHIL NFR BLD AUTO: 0.9 % — SIGNIFICANT CHANGE UP (ref 0–8)
GLUCOSE SERPL-MCNC: 104 MG/DL — HIGH (ref 70–99)
GLUCOSE SERPL-MCNC: 71 MG/DL — SIGNIFICANT CHANGE UP (ref 70–99)
GLUCOSE SERPL-MCNC: 90 MG/DL — SIGNIFICANT CHANGE UP (ref 70–99)
HCT VFR BLD CALC: 30.9 % — LOW (ref 37–47)
HCT VFR BLD CALC: 31 % — LOW (ref 37–47)
HGB BLD-MCNC: 10 G/DL — LOW (ref 12–16)
HGB BLD-MCNC: 10.1 G/DL — LOW (ref 12–16)
IMM GRANULOCYTES NFR BLD AUTO: 0.3 % — SIGNIFICANT CHANGE UP (ref 0.1–0.3)
IMM GRANULOCYTES NFR BLD AUTO: 0.5 % — HIGH (ref 0.1–0.3)
INR BLD: 1.14 RATIO — SIGNIFICANT CHANGE UP (ref 0.65–1.3)
LACTATE SERPL-SCNC: 1.4 MMOL/L — SIGNIFICANT CHANGE UP (ref 0.7–2)
LACTATE SERPL-SCNC: 1.6 MMOL/L — SIGNIFICANT CHANGE UP (ref 0.7–2)
LDH SERPL L TO P-CCNC: 163 — SIGNIFICANT CHANGE UP (ref 50–242)
LIDOCAIN IGE QN: 63 U/L — HIGH (ref 7–60)
LYMPHOCYTES # BLD AUTO: 17.3 % — LOW (ref 20.5–51.1)
LYMPHOCYTES # BLD AUTO: 2.37 K/UL — SIGNIFICANT CHANGE UP (ref 1.2–3.4)
LYMPHOCYTES # BLD AUTO: 21.1 % — SIGNIFICANT CHANGE UP (ref 20.5–51.1)
LYMPHOCYTES # BLD AUTO: 3.75 K/UL — HIGH (ref 1.2–3.4)
MAGNESIUM SERPL-MCNC: 1.5 MG/DL — LOW (ref 1.8–2.4)
MCHC RBC-ENTMCNC: 29.4 PG — SIGNIFICANT CHANGE UP (ref 27–31)
MCHC RBC-ENTMCNC: 29.5 PG — SIGNIFICANT CHANGE UP (ref 27–31)
MCHC RBC-ENTMCNC: 32.4 G/DL — SIGNIFICANT CHANGE UP (ref 32–37)
MCHC RBC-ENTMCNC: 32.6 G/DL — SIGNIFICANT CHANGE UP (ref 32–37)
MCV RBC AUTO: 90.6 FL — SIGNIFICANT CHANGE UP (ref 81–99)
MCV RBC AUTO: 90.9 FL — SIGNIFICANT CHANGE UP (ref 81–99)
MONOCYTES # BLD AUTO: 0.84 K/UL — HIGH (ref 0.1–0.6)
MONOCYTES # BLD AUTO: 1.34 K/UL — HIGH (ref 0.1–0.6)
MONOCYTES NFR BLD AUTO: 6.1 % — SIGNIFICANT CHANGE UP (ref 1.7–9.3)
MONOCYTES NFR BLD AUTO: 7.5 % — SIGNIFICANT CHANGE UP (ref 1.7–9.3)
NEUTROPHILS # BLD AUTO: 10.23 K/UL — HIGH (ref 1.4–6.5)
NEUTROPHILS # BLD AUTO: 12.45 K/UL — HIGH (ref 1.4–6.5)
NEUTROPHILS NFR BLD AUTO: 70.1 % — SIGNIFICANT CHANGE UP (ref 42.2–75.2)
NEUTROPHILS NFR BLD AUTO: 74.8 % — SIGNIFICANT CHANGE UP (ref 42.2–75.2)
NRBC # BLD: 0 /100 WBCS — SIGNIFICANT CHANGE UP (ref 0–0)
NRBC # BLD: 0 /100 WBCS — SIGNIFICANT CHANGE UP (ref 0–0)
PLATELET # BLD AUTO: 201 K/UL — SIGNIFICANT CHANGE UP (ref 130–400)
PLATELET # BLD AUTO: 216 K/UL — SIGNIFICANT CHANGE UP (ref 130–400)
PMV BLD: 11 FL — HIGH (ref 7.4–10.4)
PMV BLD: 11.2 FL — HIGH (ref 7.4–10.4)
POTASSIUM SERPL-MCNC: 3.9 MMOL/L — SIGNIFICANT CHANGE UP (ref 3.5–5)
POTASSIUM SERPL-MCNC: 4 MMOL/L — SIGNIFICANT CHANGE UP (ref 3.5–5)
POTASSIUM SERPL-MCNC: 4.3 MMOL/L — SIGNIFICANT CHANGE UP (ref 3.5–5)
POTASSIUM SERPL-SCNC: 3.9 MMOL/L — SIGNIFICANT CHANGE UP (ref 3.5–5)
POTASSIUM SERPL-SCNC: 4 MMOL/L — SIGNIFICANT CHANGE UP (ref 3.5–5)
POTASSIUM SERPL-SCNC: 4.3 MMOL/L — SIGNIFICANT CHANGE UP (ref 3.5–5)
PROT SERPL-MCNC: 5.7 G/DL — LOW (ref 6–8)
PROT SERPL-MCNC: 7 G/DL — SIGNIFICANT CHANGE UP (ref 6–8)
PROTHROM AB SERPL-ACNC: 13 SEC — HIGH (ref 9.95–12.87)
RBC # BLD: 3.4 M/UL — LOW (ref 4.2–5.4)
RBC # BLD: 3.42 M/UL — LOW (ref 4.2–5.4)
RBC # FLD: 12.7 % — SIGNIFICANT CHANGE UP (ref 11.5–14.5)
RBC # FLD: 12.8 % — SIGNIFICANT CHANGE UP (ref 11.5–14.5)
SODIUM SERPL-SCNC: 141 MMOL/L — SIGNIFICANT CHANGE UP (ref 135–146)
WBC # BLD: 13.69 K/UL — HIGH (ref 4.8–10.8)
WBC # BLD: 17.77 K/UL — HIGH (ref 4.8–10.8)
WBC # FLD AUTO: 13.69 K/UL — HIGH (ref 4.8–10.8)
WBC # FLD AUTO: 17.77 K/UL — HIGH (ref 4.8–10.8)

## 2024-09-27 PROCEDURE — 86304 IMMUNOASSAY TUMOR CA 125: CPT

## 2024-09-27 PROCEDURE — 71045 X-RAY EXAM CHEST 1 VIEW: CPT | Mod: 26

## 2024-09-27 PROCEDURE — 83735 ASSAY OF MAGNESIUM: CPT

## 2024-09-27 PROCEDURE — 86850 RBC ANTIBODY SCREEN: CPT

## 2024-09-27 PROCEDURE — 86301 IMMUNOASSAY TUMOR CA 19-9: CPT

## 2024-09-27 PROCEDURE — 99222 1ST HOSP IP/OBS MODERATE 55: CPT

## 2024-09-27 PROCEDURE — 84100 ASSAY OF PHOSPHORUS: CPT

## 2024-09-27 PROCEDURE — 85025 COMPLETE CBC W/AUTO DIFF WBC: CPT

## 2024-09-27 PROCEDURE — 74018 RADEX ABDOMEN 1 VIEW: CPT

## 2024-09-27 PROCEDURE — 82378 CARCINOEMBRYONIC ANTIGEN: CPT

## 2024-09-27 PROCEDURE — 99223 1ST HOSP IP/OBS HIGH 75: CPT

## 2024-09-27 PROCEDURE — 86900 BLOOD TYPING SEROLOGIC ABO: CPT

## 2024-09-27 PROCEDURE — 99232 SBSQ HOSP IP/OBS MODERATE 35: CPT

## 2024-09-27 PROCEDURE — 74177 CT ABD & PELVIS W/CONTRAST: CPT | Mod: 26,MC

## 2024-09-27 PROCEDURE — 80048 BASIC METABOLIC PNL TOTAL CA: CPT

## 2024-09-27 PROCEDURE — 86901 BLOOD TYPING SEROLOGIC RH(D): CPT

## 2024-09-27 PROCEDURE — 36415 COLL VENOUS BLD VENIPUNCTURE: CPT

## 2024-09-27 PROCEDURE — 76856 US EXAM PELVIC COMPLETE: CPT

## 2024-09-27 PROCEDURE — 84704 HCG FREE BETACHAIN TEST: CPT

## 2024-09-27 PROCEDURE — 80053 COMPREHEN METABOLIC PANEL: CPT

## 2024-09-27 RX ORDER — ENOXAPARIN SODIUM 150 MG/ML
40 INJECTION SUBCUTANEOUS EVERY 24 HOURS
Refills: 0 | Status: DISCONTINUED | OUTPATIENT
Start: 2024-09-27 | End: 2024-09-29

## 2024-09-27 RX ORDER — MAGNESIUM SULFATE 500 MG/ML
2 VIAL (ML) INJECTION
Refills: 0 | Status: COMPLETED | OUTPATIENT
Start: 2024-09-27 | End: 2024-09-27

## 2024-09-27 RX ORDER — PIPERACILLIN SODIUM AND TAZOBACTAM SODIUM 12; 1.5 G/60ML; G/60ML
3.38 INJECTION, POWDER, LYOPHILIZED, FOR SOLUTION INTRAVENOUS ONCE
Refills: 0 | Status: DISCONTINUED | OUTPATIENT
Start: 2024-09-27 | End: 2024-09-27

## 2024-09-27 RX ORDER — MORPHINE SULFATE 30 MG/1
4 TABLET, FILM COATED, EXTENDED RELEASE ORAL ONCE
Refills: 0 | Status: DISCONTINUED | OUTPATIENT
Start: 2024-09-27 | End: 2024-09-27

## 2024-09-27 RX ORDER — ACETAMINOPHEN 325 MG
650 TABLET ORAL EVERY 6 HOURS
Refills: 0 | Status: DISCONTINUED | OUTPATIENT
Start: 2024-09-27 | End: 2024-09-29

## 2024-09-27 RX ORDER — PANTOPRAZOLE SODIUM 40 MG/1
40 TABLET, DELAYED RELEASE ORAL
Refills: 0 | Status: DISCONTINUED | OUTPATIENT
Start: 2024-09-27 | End: 2024-09-29

## 2024-09-27 RX ORDER — NOREPINEPHRINE BITARTRATE/D5W 16MG/250ML
0.05 PLASTIC BAG, INJECTION (ML) INTRAVENOUS
Qty: 8 | Refills: 0 | Status: DISCONTINUED | OUTPATIENT
Start: 2024-09-27 | End: 2024-09-28

## 2024-09-27 RX ORDER — VANCOMYCIN HCL-SODIUM CHLORIDE IV SOLN 1.5 GM/250ML-0.9% 1.5-0.9/25 GM/ML-%
1250 SOLUTION INTRAVENOUS ONCE
Refills: 0 | Status: DISCONTINUED | OUTPATIENT
Start: 2024-09-27 | End: 2024-09-27

## 2024-09-27 RX ORDER — SODIUM CHLORIDE IRRIG SOLUTION 0.9 %
1000 SOLUTION, IRRIGATION IRRIGATION
Refills: 0 | Status: DISCONTINUED | OUTPATIENT
Start: 2024-09-27 | End: 2024-09-28

## 2024-09-27 RX ORDER — PANTOPRAZOLE SODIUM 40 MG/1
40 TABLET, DELAYED RELEASE ORAL ONCE
Refills: 0 | Status: COMPLETED | OUTPATIENT
Start: 2024-09-27 | End: 2024-09-27

## 2024-09-27 RX ORDER — SODIUM CHLORIDE IRRIG SOLUTION 0.9 %
1000 SOLUTION, IRRIGATION IRRIGATION ONCE
Refills: 0 | Status: COMPLETED | OUTPATIENT
Start: 2024-09-27 | End: 2024-09-27

## 2024-09-27 RX ORDER — ACETAMINOPHEN 325 MG
650 TABLET ORAL ONCE
Refills: 0 | Status: COMPLETED | OUTPATIENT
Start: 2024-09-27 | End: 2024-09-27

## 2024-09-27 RX ORDER — SODIUM CHLORIDE IRRIG SOLUTION 0.9 %
2000 SOLUTION, IRRIGATION IRRIGATION ONCE
Refills: 0 | Status: COMPLETED | OUTPATIENT
Start: 2024-09-27 | End: 2024-09-27

## 2024-09-27 RX ORDER — ONDANSETRON HCL/PF 4 MG/2 ML
4 VIAL (ML) INJECTION EVERY 8 HOURS
Refills: 0 | Status: DISCONTINUED | OUTPATIENT
Start: 2024-09-27 | End: 2024-09-29

## 2024-09-27 RX ORDER — VANCOMYCIN HCL-SODIUM CHLORIDE IV SOLN 1.5 GM/250ML-0.9% 1.5-0.9/25 GM/ML-%
1500 SOLUTION INTRAVENOUS ONCE
Refills: 0 | Status: COMPLETED | OUTPATIENT
Start: 2024-09-27 | End: 2024-09-27

## 2024-09-27 RX ORDER — PIPERACILLIN SODIUM AND TAZOBACTAM SODIUM 12; 1.5 G/60ML; G/60ML
3.38 INJECTION, POWDER, LYOPHILIZED, FOR SOLUTION INTRAVENOUS ONCE
Refills: 0 | Status: COMPLETED | OUTPATIENT
Start: 2024-09-27 | End: 2024-09-27

## 2024-09-27 RX ADMIN — Medication 4 MILLIGRAM(S): at 16:21

## 2024-09-27 RX ADMIN — Medication 2000 MILLILITER(S): at 05:30

## 2024-09-27 RX ADMIN — Medication 2000 MILLILITER(S): at 06:54

## 2024-09-27 RX ADMIN — Medication 25 GRAM(S): at 14:04

## 2024-09-27 RX ADMIN — MORPHINE SULFATE 4 MILLIGRAM(S): 30 TABLET, FILM COATED, EXTENDED RELEASE ORAL at 05:02

## 2024-09-27 RX ADMIN — Medication 100 MILLILITER(S): at 09:56

## 2024-09-27 RX ADMIN — Medication 8.5 MICROGRAM(S)/KG/MIN: at 06:11

## 2024-09-27 RX ADMIN — VANCOMYCIN HCL-SODIUM CHLORIDE IV SOLN 1.5 GM/250ML-0.9% 300 MILLIGRAM(S): 1.5-0.9/25 SOLUTION at 07:13

## 2024-09-27 RX ADMIN — Medication 2000 MILLILITER(S): at 04:14

## 2024-09-27 RX ADMIN — PANTOPRAZOLE SODIUM 40 MILLIGRAM(S): 40 TABLET, DELAYED RELEASE ORAL at 05:23

## 2024-09-27 RX ADMIN — Medication 25 GRAM(S): at 16:22

## 2024-09-27 RX ADMIN — PIPERACILLIN SODIUM AND TAZOBACTAM SODIUM 200 GRAM(S): 12; 1.5 INJECTION, POWDER, LYOPHILIZED, FOR SOLUTION INTRAVENOUS at 05:02

## 2024-09-27 RX ADMIN — Medication 650 MILLIGRAM(S): at 05:03

## 2024-09-27 RX ADMIN — Medication 650 MILLIGRAM(S): at 13:48

## 2024-09-27 NOTE — H&P ADULT - HISTORY OF PRESENT ILLNESS
69-year-old female history of hypertension GERD dyslipidemia presented to ED for abdominal pain yesterday evening s/p EGD in am.  No nausea vomiting.  No bright red blood per rectum or melena.     In the ED patient became hypotensive to 81/48 and recorded oral temp of 100.5 with hr 105 at 4am vital check   Sig Labs WBC 12.17 BUN/Scr 24/1.4  CTAP w/IV contrast   IMPRESSION:  1.  No definite evidence of acute abdominal pelvic pathology.  2.  Large midline cystic structure in the pelvis extending well into the   midabdomen as described above. Although exact etiology cannot be certain   based on CT favor ovarian and as described above possibly right ovarian.   Given the size of this lesion favor ovarian cystadenoma. No definite   complexity seen on CT. No wall thickening, complex septations or nodular   components. Appears homogeneously cystic. It measures approximately 11.8   x 10.4 x 16.7 cm. Gynecologic consultation would be advised which can be   done as an outpatient unless there are other clinical concerns.  3.  Additional incidental findings as above.      Seen by GYN with impression that these ct findings are incidental       In the ed given 4L fluid bolus, dose of  vancomycin and zosyn started on low dose levophed now weaning admitted to SDU for sepsis

## 2024-09-27 NOTE — CONSULT NOTE ADULT - ASSESSMENT
68yo P2 postmenopausal female with PMH GERD, HTN, HLD with sudden onset abdominal pain, with incidental finding of abdominal/pelvic mass, unlikely related to acute presentation.    -recommend tumor markers  -recommend pelvic ultrasound  -IVF resuscitation  -recommend GI consult  -recommend follow up with primary GYN as an outpatient for surgical planning       Discussed with Dr. Howell 70yo P2 postmenopausal female with PMH GERD, HTN, HLD with sudden onset abdominal pain, with incidental finding of abdominal/pelvic mass, unlikely related to acute presentation.    -recommend tumor markers  -recommend pelvic ultrasound  -IVF resuscitation  -recommend GI consult  -recommend follow up with primary GYN as an outpatient for surgical planning   -GYN will follow in house      Discussed with Dr. Howell

## 2024-09-27 NOTE — CONSULT NOTE ADULT - SUBJECTIVE AND OBJECTIVE BOX
PGY 4 Note    Chief Complaint: abdominal pain    HPI: 70yo P2 postmenopausal female with PMH GERD, HTN, HLD presents for sudden onset abdominal pain after eating this evening. States the pain is epigastric in origin. 10/10. Of note, patient had EGD today at [facility] and had the removal of polyp with clips. States pain started immediately after eating. Denies nausea or vomiting. Denies diarrhea or constipation. Reports months of early satiety and bloating. Denies weight loss. Endorses fevers in the ER Tmax 100.5F. Follows with Dr. Skelton for GYN care. Has not seen her recently.     Ob/Gyn History:  P2, LTCSx2              Denies history of ovarian cysts, uterine fibroids, abnormal paps, or STIs    Home Medications:  losartan 100 mg oral tablet: 1 tab(s) orally once a day (26 Sep 2024 16:17)  Prevacid: 40  orally (26 Sep 2024 16:17)    No Known Allergies    PAST MEDICAL & SURGICAL HISTORY:  Hypertension  Hyperlipidemia  Exposure to COVID-19 virus  History of torn meniscus of left knee  H/O  section    FAMILY HISTORY: denies family hx of endometrial, ovarian or colon cancer    SOCIAL HISTORY: approximately 6 year pack history. No longer smoking. Denies alcohol use, or illicit drug use    Vital Signs Last 24 Hrs  T(F): 100.5 (27 Sep 2024 04:07), Max: 100.5 (27 Sep 2024 04:07)  HR: 101 (27 Sep 2024 04:39) (64 - 125)  BP: 103/54 (27 Sep 2024 04:39) (81/48 - 167/77)  RR: 17 (27 Sep 2024 04:39) (16 - 19)    General Appearance - AAOx3, NAD  Heart - S1S2 regular rate and rhythm  Lung - CTA Bilaterally  Abdomen - Soft, nontender, nondistended, no rebound, no rigidity, no guarding, bowel sounds present    GYN/Pelvis:    Labia Majora - Normal  Labia Minora - Normal  Clitoris - Normal  Urethra - Normal  Vagina - Normal  Cervix - Normal    Uterus:  difficult to assess due to body habitus    Adnexa:  difficult to assess due to body habitus      Meds:   famotidine Injectable 20 milliGRAM(s) IV Push once  lactated ringers Bolus 2000 milliLiter(s) IV Bolus once  morphine  - Injectable 4 milliGRAM(s) IV Push once  sodium chloride 0.9% Bolus 1000 milliLiter(s) IV Bolus once    Height (cm): 162.6 (24 @ 22:05), 162.6 (24 @ 16:20)  Weight (kg): 90.7 (24 @ 22:05), 90.7 (24 @ 16:20)  BMI (kg/m2): 34.3 (24 @ 22:05), 34.3 (24 @ 16:20)  BSA (m2): 1.96 (24 @ 22:05), 1.96 (24 @ 16:20)    LABS:                        12.0   12.17 )-----------( 238      ( 26 Sep 2024 23:35 )             36.1             141  |  106  |  24[H]  ----------------------------<  104[H]  4.3   |  23  |  1.4    Ca    9.5      26 Sep 2024 23:35    TPro  7.0  /  Alb  4.1  /  TBili  0.5  /  DBili  x   /  AST  30  /  ALT  20  /  AlkPhos  102        Urinalysis Basic - ( 26 Sep 2024 23:35 )    Color: x / Appearance: x / SG: x / pH: x  Gluc: 104 mg/dL / Ketone: x  / Bili: x / Urobili: x   Blood: x / Protein: x / Nitrite: x   Leuk Esterase: x / RBC: x / WBC x   Sq Epi: x / Non Sq Epi: x / Bacteria: x      RADIOLOGY & ADDITIONAL STUDIES:  < from: CT Abdomen and Pelvis w/ IV Cont (24 @ 01:31) >  CT ABDOMEN AND PELVIS IC   ORDERED BY: JESSENIA HELM     PROCEDURE DATE:  2024          INTERPRETATION:  CLINICAL INFORMATION: Abdominal pain. Upper endoscopy   earlier the same day.    COMPARISON: None.    CONTRAST/COMPLICATIONS:  IV Contrast: Omnipaque 350  100 cc administered   0 cc discarded  Oral Contrast: NONE  Complications: None reported at time of study completion    PROCEDURE:  CT of the Abdomen and Pelvis was performed.  Sagittal and coronal reformats were performed.    FINDINGS:  LOWER CHEST: Patchy consolidated density in the lingula possibly   atelectasis or scarring. Infection cannot be entirely excluded. Small   type III combined hiatal hernia.    LIVER: Mild steatosis  BILE DUCTS: Normal caliber.  GALLBLADDER: Within normal limits.  SPLEEN: Within normal limits.  PANCREAS: Within normal limits.  ADRENALS: Within normal limits.  KIDNEYS/URETERS: Symmetric renal enhancement. No hydronephrosis    BLADDER: Moderately distended bladder was otherwise normal in appearance.  REPRODUCTIVE ORGANS: Midline large cystic structure abutting the uterine   fundus along its inferior aspect and extending well into the abdomen   superior to the umbilicus. Etiology not entirely certain though highly   favor ovarian origin as a completely cystic exophytic uterine fibroid in   otherwise normal appearing uterus would be unusual. Given the midline   location and no definite visualization of the ovaries is unclear which   side this is arising from however there is small suggestion that a normal   left adnexa can be visualized (series 3 image 110) therefore favor right   ovarian origin. Cystic structure is entirely homogeneous fluid   attenuation with no thickened wall, septations or nodular components   appreciated on CT. This measures approximately 11.8 x 10.4 x 16.7 cm    BOWEL: No bowel obstruction. Appendix is normal. Colonic diverticulosis   without evidence of acute diverticulitis. Linear densities noted in the   gastric pyloric/duodenal bulb region possibly representing endoscopic   clips given the history of EGD earlier the same day.  PERITONEUM/RETROPERITONEUM: Within normal limits.  VESSELS: Mild atherosclerosis. No abdominal aortic aneurysm.  LYMPH NODES: No lymphadenopathy.  ABDOMINAL WALL: Tiny fat-containing umbilical hernia.  BONES: Degenerative changes along the vertebral column. Small disc   osteophyte complex noted at L5-S1.      IMPRESSION:  1.  No definite evidence of acute abdominal pelvic pathology.  2.  Large midline cystic structure in the pelvis extending well into the   midabdomen as described above. Although exact etiology cannot be certain   based on CT favor ovarian and as described above possibly right ovarian.   Given the size of this lesion favor ovarian cystadenoma. No definite   complexity seen on CT. No wall thickening, complex septations or nodular   components. Appears homogeneously cystic. It measures approximately 11.8   x 10.4 x 16.7 cm. Gynecologic consultation would be advised which can be   done as anoutpatient unless there are other clinical concerns.  3.  Additional incidental findings as above.        --- End of Report ---              < end of copied text >   PGY 4 Note    Chief Complaint: abdominal pain    HPI: 70yo P2 postmenopausal female with PMH GERD, HTN, HLD presents for sudden onset abdominal pain after eating this evening. States the pain is epigastric in origin. 10/10. Of note, patient had EGD today at [facility] and had the removal of polyp with clips. States pain started immediately after eating. Was discharged around 7pm last night. Denies nausea or vomiting. Denies diarrhea or constipation. Reports months of early satiety and bloating. Denies weight loss. Endorses fevers in the ER Tmax 100.5F. Follows with Dr. Skelton for GYN care. Has not seen her recently.     Ob/Gyn History:  P2, LTCSx2              Denies history of ovarian cysts, uterine fibroids, abnormal paps, or STIs    Home Medications:  losartan 100 mg oral tablet: 1 tab(s) orally once a day (26 Sep 2024 16:17)  Prevacid: 40  orally (26 Sep 2024 16:17)    No Known Allergies    PAST MEDICAL & SURGICAL HISTORY:  Hypertension  Hyperlipidemia  Exposure to COVID-19 virus  History of torn meniscus of left knee  H/O  section    FAMILY HISTORY: denies family hx of endometrial, ovarian or colon cancer    SOCIAL HISTORY: approximately 6 year pack history. No longer smoking. Denies alcohol use, or illicit drug use    Vital Signs Last 24 Hrs  T(F): 100.5 (27 Sep 2024 04:07), Max: 100.5 (27 Sep 2024 04:07)  HR: 101 (27 Sep 2024 04:39) (64 - 125)  BP: 103/54 (27 Sep 2024 04:39) (81/48 - 167/77)  RR: 17 (27 Sep 2024 04:39) (16 - 19)    General Appearance - AAOx3, NAD  Heart - S1S2 regular rate and rhythm  Lung - CTA Bilaterally  Abdomen - Soft, nontender, nondistended, no rebound, no rigidity, no guarding, bowel sounds present    GYN/Pelvis:    Labia Majora - Normal  Labia Minora - Normal  Clitoris - Normal  Urethra - Normal  Vagina - Normal  Cervix - Normal    Uterus:  difficult to assess due to body habitus    Adnexa:  difficult to assess due to body habitus      Meds:   famotidine Injectable 20 milliGRAM(s) IV Push once  lactated ringers Bolus 2000 milliLiter(s) IV Bolus once  morphine  - Injectable 4 milliGRAM(s) IV Push once  sodium chloride 0.9% Bolus 1000 milliLiter(s) IV Bolus once    Height (cm): 162.6 (24 @ 22:05), 162.6 (24 @ 16:20)  Weight (kg): 90.7 (24 @ 22:05), 90.7 (24 @ 16:20)  BMI (kg/m2): 34.3 (24 @ 22:05), 34.3 (24 @ 16:20)  BSA (m2): 1.96 (24 @ 22:05), 1.96 (24 @ 16:20)    LABS:                        12.0   12.17 )-----------( 238      ( 26 Sep 2024 23:35 )             36.1             141  |  106  |  24[H]  ----------------------------<  104[H]  4.3   |  23  |  1.4    Ca    9.5      26 Sep 2024 23:35    TPro  7.0  /  Alb  4.1  /  TBili  0.5  /  DBili  x   /  AST  30  /  ALT  20  /  AlkPhos  102        Urinalysis Basic - ( 26 Sep 2024 23:35 )    Color: x / Appearance: x / SG: x / pH: x  Gluc: 104 mg/dL / Ketone: x  / Bili: x / Urobili: x   Blood: x / Protein: x / Nitrite: x   Leuk Esterase: x / RBC: x / WBC x   Sq Epi: x / Non Sq Epi: x / Bacteria: x      RADIOLOGY & ADDITIONAL STUDIES:  < from: CT Abdomen and Pelvis w/ IV Cont (24 @ 01:31) >  CT ABDOMEN AND PELVIS IC   ORDERED BY: JESSENIA HELM     PROCEDURE DATE:  2024          INTERPRETATION:  CLINICAL INFORMATION: Abdominal pain. Upper endoscopy   earlier the same day.    COMPARISON: None.    CONTRAST/COMPLICATIONS:  IV Contrast: Omnipaque 350  100 cc administered   0 cc discarded  Oral Contrast: NONE  Complications: None reported at time of study completion    PROCEDURE:  CT of the Abdomen and Pelvis was performed.  Sagittal and coronal reformats were performed.    FINDINGS:  LOWER CHEST: Patchy consolidated density in the lingula possibly   atelectasis or scarring. Infection cannot be entirely excluded. Small   type III combined hiatal hernia.    LIVER: Mild steatosis  BILE DUCTS: Normal caliber.  GALLBLADDER: Within normal limits.  SPLEEN: Within normal limits.  PANCREAS: Within normal limits.  ADRENALS: Within normal limits.  KIDNEYS/URETERS: Symmetric renal enhancement. No hydronephrosis    BLADDER: Moderately distended bladder was otherwise normal in appearance.  REPRODUCTIVE ORGANS: Midline large cystic structure abutting the uterine   fundus along its inferior aspect and extending well into the abdomen   superior to the umbilicus. Etiology not entirely certain though highly   favor ovarian origin as a completely cystic exophytic uterine fibroid in   otherwise normal appearing uterus would be unusual. Given the midline   location and no definite visualization of the ovaries is unclear which   side this is arising from however there is small suggestion that a normal   left adnexa can be visualized (series 3 image 110) therefore favor right   ovarian origin. Cystic structure is entirely homogeneous fluid   attenuation with no thickened wall, septations or nodular components   appreciated on CT. This measures approximately 11.8 x 10.4 x 16.7 cm    BOWEL: No bowel obstruction. Appendix is normal. Colonic diverticulosis   without evidence of acute diverticulitis. Linear densities noted in the   gastric pyloric/duodenal bulb region possibly representing endoscopic   clips given the history of EGD earlier the same day.  PERITONEUM/RETROPERITONEUM: Within normal limits.  VESSELS: Mild atherosclerosis. No abdominal aortic aneurysm.  LYMPH NODES: No lymphadenopathy.  ABDOMINAL WALL: Tiny fat-containing umbilical hernia.  BONES: Degenerative changes along the vertebral column. Small disc   osteophyte complex noted at L5-S1.      IMPRESSION:  1.  No definite evidence of acute abdominal pelvic pathology.  2.  Large midline cystic structure in the pelvis extending well into the   midabdomen as described above. Although exact etiology cannot be certain   based on CT favor ovarian and as described above possibly right ovarian.   Given the size of this lesion favor ovarian cystadenoma. No definite   complexity seen on CT. No wall thickening, complex septations or nodular   components. Appears homogeneously cystic. It measures approximately 11.8   x 10.4 x 16.7 cm. Gynecologic consultation would be advised which can be   done as anoutpatient unless there are other clinical concerns.  3.  Additional incidental findings as above.        --- End of Report ---              < end of copied text >

## 2024-09-27 NOTE — CONSULT NOTE ADULT - ASSESSMENT
69-year-old female history of hypertension GERD dyslipidemia presented to ED for abdominal pain yesterday evening s/p EGD for duodenal polyp EMR. She reports having chicken and soup for dinner which started severe lower abdominal pain with bloating with chills. Denies any nausea or vomiting. In ER she was febrile with soft BP.  CTAP no perforation or infection. She was briefly on levophed and was tapered off. Gi consulted for further evaluation. Currently she denies any pain.     #Severe lower abdominal pain likely constipation- improved  #SIRS on admission with possible bacterial translocation?  #s/p EGD with duodenal polyp EMR 9/26  - s/p EGD 9/26: Normal mucosa in the whole esophagus.Erythema in the stomach compatible with non-erosive gastritis.Hill Grade 4, Hiatal Hernia.A 2cm sessile duodenal polyp was seen in the proximal bulb. (Polypectomy, Endoclip).Normal mucosa in the Duodenal bulb and Second part of duodenum.  - Was briefly on levophed and febrile  - CTAP : as above  - Phy exam: benign abdomen, soft    RECS  - Recommend ambulation, if passing gas , will recommend to start clears and advance as tolerated  - Recommend bowel regimen  - Serial abdominal exams  - Recommend short antibiotic course  - Keep K >4, Mg >2  - Monitor WBC, trend fever curve  - We will follow

## 2024-09-27 NOTE — H&P ADULT - NSHPLABSRESULTS_GEN_ALL_CORE
.  LABS:                         12.0   12.17 )-----------( 238      ( 26 Sep 2024 23:35 )             36.1     09-26    141  |  106  |  24[H]  ----------------------------<  104[H]  4.3   |  23  |  1.4    Ca    9.5      26 Sep 2024 23:35    TPro  7.0  /  Alb  4.1  /  TBili  0.5  /  DBili  x   /  AST  30  /  ALT  20  /  AlkPhos  102  09-26    PT/INR - ( 27 Sep 2024 04:33 )   PT: 13.00 sec;   INR: 1.14 ratio         PTT - ( 27 Sep 2024 04:33 )  PTT:30.8 sec  Urinalysis Basic - ( 26 Sep 2024 23:35 )    Color: x / Appearance: x / SG: x / pH: x  Gluc: 104 mg/dL / Ketone: x  / Bili: x / Urobili: x   Blood: x / Protein: x / Nitrite: x   Leuk Esterase: x / RBC: x / WBC x   Sq Epi: x / Non Sq Epi: x / Bacteria: x            Lactate, Blood: 1.4 mmol/L (09-27 @ 04:33)  Lactate, Blood: 1.6 mmol/L (09-26 @ 23:35)      RADIOLOGY, EKG & ADDITIONAL TESTS: Reviewed.

## 2024-09-27 NOTE — H&P ADULT - ASSESSMENT
IMPRESSION:  #Gastroenteritis vs ileus post egd   #Sepsis POA  #Pelvic Mass, Incidental finding on ct  #Ho HTN     PLAN:    CNS: Avoid Sedatives    HEENT: Oral care    PULMONARY:  HOB @ 45 degrees. O2 Sat > 94%    CARDIOVASCULAR: MAP > 65. Hold home antihypertensives. Goal directed fluids avoid overload. Wean pressor.     GI: GI prophylaxis. CLD for now.  Bowel regimen.     RENAL:  Follow up lytes.  Correct as needed    INFECTIOUS DISEASE: Follow up cultures. Trend WBC. Zosyn for now.     HEMATOLOGICAL:  DVT prophylaxis. Send CEA CA-125, AFP, Inhibin A,B     ENDOCRINE:  Follow up FS.  Insulin protocol if needed.    MUSCULOSKELETAL: Increase as tolerated. Goal is ambulation today.     Dispo: SDU     IMPRESSION:  #Gastroenteritis vs ileus post egd   #SIRS present on admission, probably reactive.   #Pelvic Mass, Incidental finding on ct  #Ho HTN     PLAN:    CNS: Avoid Sedatives    HEENT: Oral care    PULMONARY:  HOB @ 45 degrees. O2 Sat > 94%    CARDIOVASCULAR: MAP > 65. Hold home antihypertensives. Goal directed fluids avoid overload. Wean pressor.     GI: GI prophylaxis. CLD for now.  Bowel regimen.     RENAL:  Follow up lytes.  Correct as needed    INFECTIOUS DISEASE: Follow up cultures. Trend WBC. Monitor off antibiotics fo now.     HEMATOLOGICAL:  DVT prophylaxis. Send CEA CA-125, AFP, Inhibin A,B     ENDOCRINE:  Follow up FS.  Insulin protocol if needed.    MUSCULOSKELETAL: Increase as tolerated. Goal is ambulation today.     Dispo: SDU consider downgrade when off pressors.      IMPRESSION:  #Gastroenteritis vs ileus post egd   #SIRS present on admission, probably reactive.   #Pelvic Mass, Incidental finding on ct  #Ho HTN     PLAN:    CNS: Avoid Sedatives    HEENT: Oral care    PULMONARY:  HOB @ 45 degrees. O2 Sat > 94%    CARDIOVASCULAR: MAP > 65. Hold home antihypertensives. Goal directed fluids avoid overload    GI: GI prophylaxis. CLD for now.  Bowel regimen.     RENAL:  Follow up lytes.  Correct as needed    INFECTIOUS DISEASE: Follow up cultures. Trend WBC. Monitor off antibiotics fo now.     HEMATOLOGICAL:  DVT prophylaxis. Send CEA CA-125, AFP, Inhibin A,B     ENDOCRINE:  Follow up FS.  Insulin protocol if needed.    MUSCULOSKELETAL: Increase as tolerated. Goal is ambulation today.     Dispo: SDU

## 2024-09-27 NOTE — DISCHARGE NOTE NURSING/CASE MANAGEMENT/SOCIAL WORK - NSDCPEFALRISK_GEN_ALL_CORE
For information on Fall & Injury Prevention, visit: [email or web address] OR  [email or web address] OR  https://www.cdc.gov/steadi/patient.html

## 2024-09-27 NOTE — H&P ADULT - NSHPPHYSICALEXAM_GEN_ALL_CORE
LOS:     VITALS:   T(C): 37 (09-27-24 @ 06:20), Max: 38.1 (09-27-24 @ 04:07)  HR: 75 (09-27-24 @ 09:58) (64 - 125)  BP: 117/59 (09-27-24 @ 09:58) (81/48 - 167/77)  RR: 16 (09-27-24 @ 09:58) (16 - 19)  SpO2: 95% (09-27-24 @ 09:58) (95% - 100%)    GENERAL: NAD, lying in bed comfortably  HEAD:  Atraumatic, Normocephalic  EYES: EOMI, PERRLA, conjunctiva and sclera clear  ENT: Moist mucous membranes  NECK: Supple, No JVD  CHEST/LUNG: Clear to auscultation bilaterally; No rales, rhonchi, wheezing, or rubs. Unlabored respirations  HEART: Regular rate and rhythm; No murmurs, rubs, or gallops  ABDOMEN: BSx4; Soft, mildly tender to deep palpation  EXTREMITIES:  2+ Peripheral Pulses, brisk capillary refill. No clubbing, cyanosis, or edema  NERVOUS SYSTEM:  A&Ox3, no focal deficits   SKIN: No rashes or lesions

## 2024-09-27 NOTE — ED ADULT NURSE REASSESSMENT NOTE - NS ED NURSE REASSESS COMMENT FT1
Received pt on stretcher, AAOX4, resp even and unlabored, pt denies abd pain , NAD noted, v/s updated. family at bedside, ongoing plan of care.

## 2024-09-27 NOTE — H&P ADULT - ATTENDING COMMENTS
As above  Possible Gastroenteritis vs ileus post egd  Likely sirs rxn  Monitor off abx  Advance diet  Monitor for clinical improvement  Anticipated DC in 24 HRS

## 2024-09-27 NOTE — CONSULT NOTE ADULT - SUBJECTIVE AND OBJECTIVE BOX
Gastroenterology Consultation:    Patient is a 69y old  Female who presents with a chief complaint of Gastroenteritis r/o sepsis (27 Sep 2024 09:51)        Admitted on: 24      HPI:  69-year-old female history of hypertension GERD dyslipidemia presented to ED for abdominal pain yesterday evening s/p EGD in am.  No nausea vomiting.  No bright red blood per rectum or melena.     In the ED patient became hypotensive to 81/48 and recorded oral temp of 100.5 with hr 105 at 4am vital check   Sig Labs WBC 12.17 BUN/Scr 24/1.4  CTAP w/IV contrast   IMPRESSION:  1.  No definite evidence of acute abdominal pelvic pathology.  2.  Large midline cystic structure in the pelvis extending well into the   midabdomen as described above. Although exact etiology cannot be certain   based on CT favor ovarian and as described above possibly right ovarian.   Given the size of this lesion favor ovarian cystadenoma. No definite   complexity seen on CT. No wall thickening, complex septations or nodular   components. Appears homogeneously cystic. It measures approximately 11.8   x 10.4 x 16.7 cm. Gynecologic consultation would be advised which can be   done as an outpatient unless there are other clinical concerns.  3.  Additional incidental findings as above.      Seen by GYN with impression that these ct findings are incidental       In the ed given 4L fluid bolus, dose of  vancomycin and zosyn started on low dose levophed now weaning admitted to SDU for sepsis  (27 Sep 2024 09:51)        Prior EGD:    Prior Colonoscopy:      PAST MEDICAL & SURGICAL HISTORY:  Hypertension      Hyperlipidemia      Exposure to COVID-19 virus      History of torn meniscus of left knee      H/O  section            FAMILY HISTORY:      Social History:  Tobacco:  Alcohol:  Drugs:    Home Medications:  Prevacid: 40  orally (26 Sep 2024 16:17)        MEDICATIONS  (STANDING):  enoxaparin Injectable 40 milliGRAM(s) SubCutaneous every 24 hours  lactated ringers. 1000 milliLiter(s) (100 mL/Hr) IV Continuous <Continuous>  norepinephrine Infusion 0.05 MICROgram(s)/kG/Min (8.5 mL/Hr) IV Continuous <Continuous>  pantoprazole    Tablet 40 milliGRAM(s) Oral before breakfast    MEDICATIONS  (PRN):      Allergies  No Known Allergies      Review of Systems:   Constitutional:  No Fever, No Chills  ENT/Mouth:  No Hearing Changes,  No Difficulty Swallowing  Eyes:  No Eye Pain, No Vision Changes  Cardiovascular:  No Chest Pain, No Palpitations  Respiratory:  No Cough, No Dyspnea  Gastrointestinal:  As described in HPI          Physical Examination:  T(C): 37 (24 @ 06:20), Max: 38.1 (24 @ 04:07)  HR: 71 (24 @ 11:37) (66 - 125)  BP: 131/59 (24 @ 11:37) (81/48 - 143/78)  RR: 17 (24 @ 11:37) (16 - 19)  SpO2: 99% (24 @ 11:37) (95% - 99%)  Height (cm): 162.6 (24 @ 05:39), 162.6 (24 @ 16:20)  Weight (kg): 90.7 (24 @ 22:05), 90.7 (24 @ 16:20)        GENERAL: AAOx3, no acute distress.  HEAD:  Atraumatic, Normocephalic  EYES: conjunctiva and sclera clear  CHEST/LUNG: Clear to auscultation bilaterally; No wheeze, rhonchi, or rales  HEART: Regular rate and rhythm; normal S1, S2, No murmurs.  ABDOMEN: Soft, nontender, nondistended; Bowel sounds present  SKIN: Intact, no jaundice        Data:                        10.0   17.77 )-----------( 216      ( 27 Sep 2024 11:19 )             30.9     Hgb Trend:  10.0  24 @ 11:19  12.0  24 @ 23:35          141  |  106  |  24[H]  ----------------------------<  104[H]  4.3   |  23  |  1.4    Ca    9.5      26 Sep 2024 23:35    TPro  7.0  /  Alb  4.1  /  TBili  0.5  /  DBili  x   /  AST  30  /  ALT  20  /  AlkPhos  102      Liver panel trend:  TBili 0.5   /   AST 30   /   ALT 20   /   AlkP 102   /   Tptn 7.0   /   Alb 4.1    /   DBili --            PT/INR - ( 27 Sep 2024 04:33 )   PT: 13.00 sec;   INR: 1.14 ratio         PTT - ( 27 Sep 2024 04:33 )  PTT:30.8 sec        Radiology:  CT Abdomen and Pelvis w/ IV Cont:   ACC: [id number] EXAM:  CT ABDOMEN AND PELVIS IC   ORDERED BY: JESSENIA HELM     PROCEDURE DATE:  2024          INTERPRETATION:  CLINICAL INFORMATION: Abdominal pain. Upper endoscopy   earlier the same day.    COMPARISON: None.    CONTRAST/COMPLICATIONS:  IV Contrast: Omnipaque 350  100 cc administered   0 cc discarded  Oral Contrast: NONE  Complications: None reported at time of study completion    PROCEDURE:  CT of the Abdomen and Pelvis was performed.  Sagittal and coronal reformats were performed.    FINDINGS:  LOWER CHEST: Patchy consolidated density in the lingula possibly   atelectasis or scarring. Infection cannot be entirely excluded. Small   type III combined hiatal hernia.    LIVER: Mild steatosis  BILE DUCTS: Normal caliber.  GALLBLADDER: Within normal limits.  SPLEEN: Within normal limits.  PANCREAS: Within normal limits.  ADRENALS: Within normal limits.  KIDNEYS/URETERS: Symmetric renal enhancement. No hydronephrosis    BLADDER: Moderately distended bladder was otherwise normal in appearance.  REPRODUCTIVE ORGANS: Midline large cystic structure abutting the uterine   fundus along its inferior aspect and extending well into the abdomen   superior to the umbilicus. Etiology not entirely certain though highly   favor ovarian origin as a completely cystic exophytic uterine fibroid in   otherwise normal appearing uterus would be unusual. Given the midline   location and no definite visualization of the ovaries is unclear which   side this is arising from however there is small suggestion that a normal   left adnexa can be visualized (series 3 image 110) therefore favor right   ovarian origin. Cystic structure is entirely homogeneous fluid   attenuation with no thickened wall, septations or nodular components   appreciated on CT. This measures approximately 11.8 x 10.4 x 16.7 cm    BOWEL: No bowel obstruction. Appendix is normal. Colonic diverticulosis   without evidence of acute diverticulitis. Linear densities noted in the   gastric pyloric/duodenal bulb region possibly representing endoscopic   clips given the history of EGD earlier the same day.  PERITONEUM/RETROPERITONEUM: Within normal limits.  VESSELS: Mild atherosclerosis. No abdominal aortic aneurysm.  LYMPH NODES: No lymphadenopathy.  ABDOMINAL WALL: Tiny fat-containing umbilical hernia.  BONES: Degenerative changes along the vertebral column. Small disc   osteophyte complex noted at L5-S1.      IMPRESSION:  1.  No definite evidence of acute abdominal pelvic pathology.  2.  Large midline cystic structure in the pelvis extending well into the   midabdomen as described above. Although exact etiology cannot be certain   based on CT favor ovarian and as described above possibly right ovarian.   Given the size of this lesion favor ovarian cystadenoma. No definite   complexity seen on CT. No wall thickening, complex septations or nodular   components. Appears homogeneously cystic. It measures approximately 11.8   x 10.4 x 16.7 cm. Gynecologic consultation would be advised which can be   done as anoutpatient unless there are other clinical concerns.  3.  Additional incidental findings as above.        --- End of Report ---            FILIBERTO EDWARDS MD; Attending Radiologist  This document has been electronically signed. Sep 27 2024  4:05AM (24 @ 01:31)       Gastroenterology Consultation:    Patient is a 69y old  Female who presents with a chief complaint of Gastroenteritis r/o sepsis (27 Sep 2024 09:51)        Admitted on: 24      HPI:  69-year-old female history of hypertension GERD dyslipidemia presented to ED for abdominal pain yesterday evening s/p EGD for duodenal polyp EMR. She reports having chicken and soup for dinner which started severe lower abdominal pain with bloating with chills. Denies any nausea or vomiting. In ER she was febrile with soft BP.  CTAP no perforation or infection. She was briefly on levophed and was tapered off. Gi consulted for further evaluation. Currently she denies any pain.   Prior EGD: : as below    Prior Colonoscopy:5 years ago with       PAST MEDICAL & SURGICAL HISTORY:  Hypertension      Hyperlipidemia      Exposure to COVID-19 virus      History of torn meniscus of left knee      H/O  section            FAMILY HISTORY:      Social History:  Tobacco:  Alcohol:  Drugs:    Home Medications:  Prevacid: 40  orally (26 Sep 2024 16:17)        MEDICATIONS  (STANDING):  enoxaparin Injectable 40 milliGRAM(s) SubCutaneous every 24 hours  lactated ringers. 1000 milliLiter(s) (100 mL/Hr) IV Continuous <Continuous>  norepinephrine Infusion 0.05 MICROgram(s)/kG/Min (8.5 mL/Hr) IV Continuous <Continuous>  pantoprazole    Tablet 40 milliGRAM(s) Oral before breakfast    MEDICATIONS  (PRN):      Allergies  No Known Allergies      Review of Systems:   Constitutional:  No Fever, No Chills  ENT/Mouth:  No Hearing Changes,  No Difficulty Swallowing  Eyes:  No Eye Pain, No Vision Changes  Cardiovascular:  No Chest Pain, No Palpitations  Respiratory:  No Cough, No Dyspnea  Gastrointestinal:  As described in HPI          Physical Examination:  T(C): 37 (24 @ 06:20), Max: 38.1 (24 @ 04:07)  HR: 71 (24 @ 11:37) (66 - 125)  BP: 131/59 (24 @ 11:37) (81/48 - 143/78)  RR: 17 (24 @ 11:37) (16 - 19)  SpO2: 99% (24 @ 11:37) (95% - 99%)  Height (cm): 162.6 (24 @ 05:39), 162.6 (24 @ 16:20)  Weight (kg): 90.7 (24 @ 22:05), 90.7 (24 @ 16:20)        GENERAL: AAOx3, no acute distress.  HEAD:  Atraumatic, Normocephalic  EYES: conjunctiva and sclera clear  CHEST/LUNG: Clear to auscultation bilaterally; No wheeze, rhonchi, or rales  HEART: Regular rate and rhythm; normal S1, S2, No murmurs.  ABDOMEN: Soft, nontender, nondistended; Bowel sounds present  SKIN: Intact, no jaundice        Data:                        10.0   17.77 )-----------( 216      ( 27 Sep 2024 11:19 )             30.9     Hgb Trend:  10.0  24 @ 11:19  12.0  24 @ 23:35          141  |  106  |  24[H]  ----------------------------<  104[H]  4.3   |  23  |  1.4    Ca    9.5      26 Sep 2024 23:35    TPro  7.0  /  Alb  4.1  /  TBili  0.5  /  DBili  x   /  AST  30  /  ALT  20  /  AlkPhos  102      Liver panel trend:  TBili 0.5   /   AST 30   /   ALT 20   /   AlkP 102   /   Tptn 7.0   /   Alb 4.1    /   DBili --            PT/INR - ( 27 Sep 2024 04:33 )   PT: 13.00 sec;   INR: 1.14 ratio         PTT - ( 27 Sep 2024 04:33 )  PTT:30.8 sec        Radiology:  CT Abdomen and Pelvis w/ IV Cont:   ACC: [id number] EXAM:  CT ABDOMEN AND PELVIS IC   ORDERED BY: JESSENIA HELM     PROCEDURE DATE:  2024          INTERPRETATION:  CLINICAL INFORMATION: Abdominal pain. Upper endoscopy   earlier the same day.    COMPARISON: None.    CONTRAST/COMPLICATIONS:  IV Contrast: Omnipaque 350  100 cc administered   0 cc discarded  Oral Contrast: NONE  Complications: None reported at time of study completion    PROCEDURE:  CT of the Abdomen and Pelvis was performed.  Sagittal and coronal reformats were performed.    FINDINGS:  LOWER CHEST: Patchy consolidated density in the lingula possibly   atelectasis or scarring. Infection cannot be entirely excluded. Small   type III combined hiatal hernia.    LIVER: Mild steatosis  BILE DUCTS: Normal caliber.  GALLBLADDER: Within normal limits.  SPLEEN: Within normal limits.  PANCREAS: Within normal limits.  ADRENALS: Within normal limits.  KIDNEYS/URETERS: Symmetric renal enhancement. No hydronephrosis    BLADDER: Moderately distended bladder was otherwise normal in appearance.  REPRODUCTIVE ORGANS: Midline large cystic structure abutting the uterine   fundus along its inferior aspect and extending well into the abdomen   superior to the umbilicus. Etiology not entirely certain though highly   favor ovarian origin as a completely cystic exophytic uterine fibroid in   otherwise normal appearing uterus would be unusual. Given the midline   location and no definite visualization of the ovaries is unclear which   side this is arising from however there is small suggestion that a normal   left adnexa can be visualized (series 3 image 110) therefore favor right   ovarian origin. Cystic structure is entirely homogeneous fluid   attenuation with no thickened wall, septations or nodular components   appreciated on CT. This measures approximately 11.8 x 10.4 x 16.7 cm    BOWEL: No bowel obstruction. Appendix is normal. Colonic diverticulosis   without evidence of acute diverticulitis. Linear densities noted in the   gastric pyloric/duodenal bulb region possibly representing endoscopic   clips given the history of EGD earlier the same day.  PERITONEUM/RETROPERITONEUM: Within normal limits.  VESSELS: Mild atherosclerosis. No abdominal aortic aneurysm.  LYMPH NODES: No lymphadenopathy.  ABDOMINAL WALL: Tiny fat-containing umbilical hernia.  BONES: Degenerative changes along the vertebral column. Small disc   osteophyte complex noted at L5-S1.      IMPRESSION:  1.  No definite evidence of acute abdominal pelvic pathology.  2.  Large midline cystic structure in the pelvis extending well into the   midabdomen as described above. Although exact etiology cannot be certain   based on CT favor ovarian and as described above possibly right ovarian.   Given the size of this lesion favor ovarian cystadenoma. No definite   complexity seen on CT. No wall thickening, complex septations or nodular   components. Appears homogeneously cystic. It measures approximately 11.8   x 10.4 x 16.7 cm. Gynecologic consultation would be advised which can be   done as anoutpatient unless there are other clinical concerns.  3.  Additional incidental findings as above.        --- End of Report ---            FILIBERTO EDWARDS MD; Attending Radiologist  This document has been electronically signed. Sep 27 2024  4:05AM (24 @ 01:31)

## 2024-09-27 NOTE — DISCHARGE NOTE NURSING/CASE MANAGEMENT/SOCIAL WORK - PATIENT PORTAL LINK FT
You can access the FollowMyHealth Patient Portal offered by [facility] by registering at the following website: [email or web address]. By joining [employer]’s FollowMyHealth portal, you will also be able to view your health information using other applications (apps) compatible with our system.

## 2024-09-27 NOTE — CONSULT NOTE ADULT - ATTENDING COMMENTS
pelvic mass  tumor markers  will follow
SP EGD EMR of a duodenal polyp admitted for abdominal pain and reported chills. Pain is in the lower abdomen, CT reviewed: no free air. Continue to monitor. IV hydration start clears monitor x24 hours and DC in the AM.

## 2024-09-27 NOTE — ED ADULT NURSE REASSESSMENT NOTE - NS ED NURSE REASSESS COMMENT FT1
Pt repeat BP is 88/45, MAR was notified, AS per MD will placed an order for levophed and will come and evaluate patient.

## 2024-09-28 ENCOUNTER — TRANSCRIPTION ENCOUNTER (OUTPATIENT)
Age: 69
End: 2024-09-28

## 2024-09-28 LAB
ALBUMIN SERPL ELPH-MCNC: 3.2 G/DL — LOW (ref 3.5–5.2)
ALP SERPL-CCNC: 74 U/L — SIGNIFICANT CHANGE UP (ref 30–115)
ALT FLD-CCNC: 13 U/L — SIGNIFICANT CHANGE UP (ref 0–41)
ANION GAP SERPL CALC-SCNC: 9 MMOL/L — SIGNIFICANT CHANGE UP (ref 7–14)
AST SERPL-CCNC: 16 U/L — SIGNIFICANT CHANGE UP (ref 0–41)
BASOPHILS # BLD AUTO: 0.04 K/UL — SIGNIFICANT CHANGE UP (ref 0–0.2)
BASOPHILS NFR BLD AUTO: 0.4 % — SIGNIFICANT CHANGE UP (ref 0–1)
BILIRUB SERPL-MCNC: 0.4 MG/DL — SIGNIFICANT CHANGE UP (ref 0.2–1.2)
BUN SERPL-MCNC: 10 MG/DL — SIGNIFICANT CHANGE UP (ref 10–20)
CALCIUM SERPL-MCNC: 8.7 MG/DL — SIGNIFICANT CHANGE UP (ref 8.4–10.5)
CHLORIDE SERPL-SCNC: 108 MMOL/L — SIGNIFICANT CHANGE UP (ref 98–110)
CO2 SERPL-SCNC: 22 MMOL/L — SIGNIFICANT CHANGE UP (ref 17–32)
CREAT SERPL-MCNC: 1.1 MG/DL — SIGNIFICANT CHANGE UP (ref 0.7–1.5)
EGFR: 55 ML/MIN/1.73M2 — LOW
EOSINOPHIL # BLD AUTO: 0.14 K/UL — SIGNIFICANT CHANGE UP (ref 0–0.7)
EOSINOPHIL NFR BLD AUTO: 1.5 % — SIGNIFICANT CHANGE UP (ref 0–8)
GLUCOSE SERPL-MCNC: 105 MG/DL — HIGH (ref 70–99)
HCT VFR BLD CALC: 29.1 % — LOW (ref 37–47)
HGB BLD-MCNC: 9.8 G/DL — LOW (ref 12–16)
IMM GRANULOCYTES NFR BLD AUTO: 0.3 % — SIGNIFICANT CHANGE UP (ref 0.1–0.3)
LYMPHOCYTES # BLD AUTO: 2.19 K/UL — SIGNIFICANT CHANGE UP (ref 1.2–3.4)
LYMPHOCYTES # BLD AUTO: 23.4 % — SIGNIFICANT CHANGE UP (ref 20.5–51.1)
MAGNESIUM SERPL-MCNC: 1.6 MG/DL — LOW (ref 1.8–2.4)
MCHC RBC-ENTMCNC: 30.1 PG — SIGNIFICANT CHANGE UP (ref 27–31)
MCHC RBC-ENTMCNC: 33.7 G/DL — SIGNIFICANT CHANGE UP (ref 32–37)
MCV RBC AUTO: 89.3 FL — SIGNIFICANT CHANGE UP (ref 81–99)
MONOCYTES # BLD AUTO: 0.56 K/UL — SIGNIFICANT CHANGE UP (ref 0.1–0.6)
MONOCYTES NFR BLD AUTO: 6 % — SIGNIFICANT CHANGE UP (ref 1.7–9.3)
NEUTROPHILS # BLD AUTO: 6.41 K/UL — SIGNIFICANT CHANGE UP (ref 1.4–6.5)
NEUTROPHILS NFR BLD AUTO: 68.4 % — SIGNIFICANT CHANGE UP (ref 42.2–75.2)
NRBC # BLD: 0 /100 WBCS — SIGNIFICANT CHANGE UP (ref 0–0)
PHOSPHATE SERPL-MCNC: 3.4 MG/DL — SIGNIFICANT CHANGE UP (ref 2.1–4.9)
PLATELET # BLD AUTO: 200 K/UL — SIGNIFICANT CHANGE UP (ref 130–400)
PMV BLD: 11.2 FL — HIGH (ref 7.4–10.4)
POTASSIUM SERPL-MCNC: 3.8 MMOL/L — SIGNIFICANT CHANGE UP (ref 3.5–5)
POTASSIUM SERPL-SCNC: 3.8 MMOL/L — SIGNIFICANT CHANGE UP (ref 3.5–5)
PROT SERPL-MCNC: 5.4 G/DL — LOW (ref 6–8)
RBC # BLD: 3.26 M/UL — LOW (ref 4.2–5.4)
RBC # FLD: 12.6 % — SIGNIFICANT CHANGE UP (ref 11.5–14.5)
SODIUM SERPL-SCNC: 139 MMOL/L — SIGNIFICANT CHANGE UP (ref 135–146)
WBC # BLD: 9.37 K/UL — SIGNIFICANT CHANGE UP (ref 4.8–10.8)
WBC # FLD AUTO: 9.37 K/UL — SIGNIFICANT CHANGE UP (ref 4.8–10.8)

## 2024-09-28 PROCEDURE — 76856 US EXAM PELVIC COMPLETE: CPT | Mod: 26

## 2024-09-28 PROCEDURE — 99233 SBSQ HOSP IP/OBS HIGH 50: CPT

## 2024-09-28 PROCEDURE — 74018 RADEX ABDOMEN 1 VIEW: CPT | Mod: 26

## 2024-09-28 RX ADMIN — Medication 200 MILLIGRAM(S): at 10:42

## 2024-09-28 RX ADMIN — Medication 100 MILLIGRAM(S): at 10:27

## 2024-09-28 RX ADMIN — Medication 100 MILLIGRAM(S): at 17:44

## 2024-09-28 RX ADMIN — Medication 200 MILLIGRAM(S): at 21:41

## 2024-09-28 RX ADMIN — Medication 650 MILLIGRAM(S): at 06:05

## 2024-09-28 RX ADMIN — PANTOPRAZOLE SODIUM 40 MILLIGRAM(S): 40 TABLET, DELAYED RELEASE ORAL at 05:47

## 2024-09-28 RX ADMIN — ENOXAPARIN SODIUM 40 MILLIGRAM(S): 150 INJECTION SUBCUTANEOUS at 10:27

## 2024-09-28 NOTE — DISCHARGE NOTE PROVIDER - NSDCCPCAREPLAN_GEN_ALL_CORE_FT
PRINCIPAL DISCHARGE DIAGNOSIS  Diagnosis: Sepsis  Assessment and Plan of Treatment: You were admitted to the hospital for severe abdominal pain, after EGD, likely due to constipation. You were treated with antibiotics for possible infection and stable for discharge. Please follow up with your pcp and Gi doctor. These appointments are scheduled.     PRINCIPAL DISCHARGE DIAGNOSIS  Diagnosis: Sepsis  Assessment and Plan of Treatment: You were admitted to the hospital for severe abdominal pain, after EGD, likely due to constipation. You were treated with antibiotics for possible infection and stable for discharge. Please follow up with your pcp and Gi doctor. These appointments are scheduled. Importantly please follow up with your Gyn doctor for evaluation of the mass in your pelvis.

## 2024-09-28 NOTE — DISCHARGE NOTE PROVIDER - CARE PROVIDERS DIRECT ADDRESSES
,[email or web address],DirectAddress_Unknown

## 2024-09-28 NOTE — DISCHARGE NOTE PROVIDER - PROVIDER TOKENS
PROVIDER:[TOKEN:[[id number]],FOLLOWUP:[2 weeks],ESTABLISHEDPATIENT:[T]],PROVIDER:[TOKEN:[[id number]],FOLLOWUP:[2 weeks],ESTABLISHEDPATIENT:[T]],PROVIDER:[TOKEN:[[id number]],FOLLOWUP:[2 weeks],ESTABLISHEDPATIENT:[T]]

## 2024-09-28 NOTE — PATIENT PROFILE ADULT - VISION (WITH CORRECTIVE LENSES IF THE PATIENT USUALLY WEARS THEM):
LM for patient regarding INR due 4/2/24. First attempt to reach patient. Advised to schedule lab appointment to have drawn at earliest convenience.   Normal vision: sees adequately in most situations; can see medication labels, newsprint

## 2024-09-28 NOTE — DISCHARGE NOTE PROVIDER - CARE PROVIDER_API CALL
Tano Mena  Internal Medicine  [address] [place] [address]  Phone: [phone]  Fax: [phone]  Established Patient  Follow Up Time: 2 weeks    Arsalan Casiano  Obstetrics and Gynecology  [address] [place] [address]  Phone: [phone]  Fax: [phone]  Established Patient  Follow Up Time: 2 weeks    Anabel Hernandez  Gastroenterology  [address] [place] [id number]  Phone: [phone]  Fax: [phone]  Established Patient  Follow Up Time: 2 weeks

## 2024-09-28 NOTE — DISCHARGE NOTE PROVIDER - NSDCFUSCHEDAPPT_GEN_ALL_CORE_FT
Leandro Chakraborty  [facility]  PULMMED [facility]  Scheduled Appointment: 11/12/2024

## 2024-09-28 NOTE — PATIENT PROFILE ADULT - FALL HARM RISK - HARM RISK INTERVENTIONS
Not applicable (known HIV negative status in last year) Offered and patient declined Communicate Risk of Fall with Harm to all staff/Reinforce activity limits and safety measures with patient and family/Tailored Fall Risk Interventions/Visual Cue: Yellow wristband and red socks/Bed in lowest position, wheels locked, appropriate side rails in place/Call bell, personal items and telephone in reach/Instruct patient to call for assistance before getting out of bed or chair/Non-slip footwear when patient is out of bed/[place] to call system/Physically safe environment - no spills, clutter or unnecessary equipment/Purposeful Proactive Rounding/Room/bathroom lighting operational, light cord in reach

## 2024-09-28 NOTE — DISCHARGE NOTE PROVIDER - NSDCMRMEDTOKEN_GEN_ALL_CORE_FT
Prevacid: 40  orally   levoFLOXacin 750 mg oral tablet: 1 tab(s) orally once a day  Prevacid: 40  orally

## 2024-09-28 NOTE — DISCHARGE NOTE PROVIDER - HOSPITAL COURSE
**This is an incomplete note** 69-year-old female history of hypertension GERD dyslipidemia presented to ED for abdominal pain yesterday evening s/p EGD for duodenal polyp EMR. In ER she was febrile with soft BP. She was briefly on levophed and was tapered off.     #Severe lower abdominal pain likely constipation- improved  #SIRS on admission with possible bacterial translocation?  #s/p EGD with duodenal polyp EMR 9/26  - s/p EGD 9/26: Normal mucosa in the whole esophagus.Erythema in the stomach compatible with non-erosive gastritis.Hill Grade 4, Hiatal Hernia.A 2cm sessile duodenal polyp was seen in the proximal bulb. (Polypectomy, Endoclip).Normal mucosa in the Duodenal bulb and Second part of duodenum.  CTAP 9/27 no perforation or infection  Advance diet as tolerated from clear liquids, pt now on regular diet  serial bowel examination  sp zosyn and vancomycin, pt was briefly hypotensive and recorded temperature of 100.5, , was briefly on norepi, ?bacterial translocation  currently on cipro 400 q12 and flagyl 500 q8  Follow-up on pending cultures, can discontinue antibiotics if cultures are negative and low suspicion for infection  trend CBC     Pelvic Mass, Incidental finding on CT, suggestive of a benign ovarian cystadenoma.   - Gynecology consulted and agrees with this assessment.  - follow up tumor marker evaluation (CEA, CA-125, AFP, Inhibin A/B)   - outpatient follow up    Hypertension, stable  - off home meds, given recent episode of hypotension  - monitor vitals    Pt anticipated for discharge soon. Discharge discussed and approved by Dr. Bello. 69-year-old female history of hypertension GERD dyslipidemia presented to ED for abdominal pain yesterday evening s/p EGD for duodenal polyp EMR. In ER she was febrile with soft BP. She was briefly on levophed and was tapered off.     #Severe lower abdominal pain likely constipation- improved  #SIRS on admission with possible bacterial translocation? Quickly downgraded to floors  #s/p EGD with duodenal polyp EMR 9/26  - s/p EGD 9/26: Normal mucosa in the whole esophagus.Erythema in the stomach compatible with non-erosive gastritis.Hill Grade 4, Hiatal Hernia.A 2cm sessile duodenal polyp was seen in the proximal bulb. (Polypectomy, Endoclip).Normal mucosa in the Duodenal bulb and Second part of duodenum.  CTAP 9/27 no perforation or infection  Advance diet as tolerated from clear liquids, pt now on regular diet  serial bowel examination  sp zosyn and vancomycin, pt was briefly hypotensive and recorded temperature of 100.5, , was briefly on norepi, ?bacterial translocation  currently on cipro 400 q12 and flagyl 500 q8  Follow-up cultures negative, will be sent home on levaquin  trend CBC     Pelvic Mass, Incidental finding on CT, suggestive of a benign ovarian cystadenoma.   - Gynecology consulted and agrees with this assessment.  - follow up tumor marker evaluation (CEA, CA-125, , HCG, LDH)  WNL, please send AFP as an OP, inhibin A and B pending  - outpatient follow up    Hypertension, stable  - off home meds, given recent episode of hypotension  - monitor vitals    Pt anticipated for discharge soon. Discharge discussed and approved by Dr. Bello. 69-year-old female history of hypertension GERD dyslipidemia presented to ED for abdominal pain yesterday evening s/p EGD for duodenal polyp EMR. In ER she was febrile with soft BP. She was briefly on levophed and was tapered off.     #Severe lower abdominal pain likely constipation- improved  #SIRS on admission with possible bacterial translocation? Quickly downgraded to floors  #s/p EGD with duodenal polyp EMR 9/26  - s/p EGD 9/26: Normal mucosa in the whole esophagus.Erythema in the stomach compatible with non-erosive gastritis.Hill Grade 4, Hiatal Hernia.A 2cm sessile duodenal polyp was seen in the proximal bulb. (Polypectomy, Endoclip).Normal mucosa in the Duodenal bulb and Second part of duodenum.  CTAP 9/27 no perforation or infection  Advance diet as tolerated from clear liquids, pt now on regular diet  serial bowel examination  sp zosyn and vancomycin, pt was briefly hypotensive and recorded temperature of 100.5, , was briefly on norepi, ?bacterial translocation  currently on cipro 400 q12 and flagyl 500 q8  Follow-up cultures negative, will be sent home on levaquin  trend CBC     Pelvic Mass, Incidental finding on CT, suggestive of a benign ovarian cystadenoma.   - Gynecology consulted and agrees with this assessment.  - follow up tumor marker evaluation (CEA, CA-125, , HCG, LDH)  WNL, please send AFP as an OP, inhibin A and B pending  - outpatient follow up    Hypertension, stable  - off home meds, given recent episode of hypotension  - monitor vitals    Pt anticipated for discharge soon. Discharge discussed and approved by Dr. Bello. Pt will be given follow up with her PCP, GI and GYn 69-year-old female history of hypertension GERD dyslipidemia presented to ED for abdominal pain yesterday evening s/p EGD for duodenal polyp EMR. In ER she was febrile with soft BP. She was briefly on levophed and was tapered off.     #Severe lower abdominal pain likely constipation- improved  #SIRS on admission with possible bacterial translocation? Quickly downgraded to floors  #s/p EGD with duodenal polyp EMR 9/26  - s/p EGD 9/26: Normal mucosa in the whole esophagus.Erythema in the stomach compatible with non-erosive gastritis.Hill Grade 4, Hiatal Hernia.A 2cm sessile duodenal polyp was seen in the proximal bulb. (Polypectomy, Endoclip).Normal mucosa in the Duodenal bulb and Second part of duodenum.  CTAP 9/27 no perforation or infection  Advance diet as tolerated from clear liquids, pt now on regular diet  serial bowel examination  sp zosyn and vancomycin, pt was briefly hypotensive and recorded temperature of 100.5, , was briefly on norepi, ?bacterial translocation  currently on cipro 400 q12 and flagyl 500 q8  Follow-up cultures negative, will be sent home on levaquin  trend CBC     Pelvic Mass, Incidental finding on CT, suggestive of a benign ovarian cystadenoma.   - Gynecology consulted and agrees with this assessment.  - tumor marker evaluation (CEA, CA-125, , HCG, LDH)  WNL, please send AFP as an OP, inhibin A and B pending  - outpatient follow up    Hypertension, stable  - off home meds, given recent episode of hypotension  - monitor vitals    Pt to be discharged today. Discharge discussed and approved by Dr. Bello. Pt will be given follow up with her PCP, GI and GYn

## 2024-09-29 VITALS
TEMPERATURE: 99 F | HEART RATE: 71 BPM | SYSTOLIC BLOOD PRESSURE: 127 MMHG | RESPIRATION RATE: 17 BRPM | DIASTOLIC BLOOD PRESSURE: 64 MMHG | OXYGEN SATURATION: 97 %

## 2024-09-29 LAB
ALBUMIN SERPL ELPH-MCNC: 3.7 G/DL — SIGNIFICANT CHANGE UP (ref 3.5–5.2)
ALP SERPL-CCNC: 84 U/L — SIGNIFICANT CHANGE UP (ref 30–115)
ALT FLD-CCNC: 14 U/L — SIGNIFICANT CHANGE UP (ref 0–41)
ANION GAP SERPL CALC-SCNC: 11 MMOL/L — SIGNIFICANT CHANGE UP (ref 7–14)
AST SERPL-CCNC: 20 U/L — SIGNIFICANT CHANGE UP (ref 0–41)
BASOPHILS # BLD AUTO: 0.06 K/UL — SIGNIFICANT CHANGE UP (ref 0–0.2)
BASOPHILS NFR BLD AUTO: 0.6 % — SIGNIFICANT CHANGE UP (ref 0–1)
BILIRUB SERPL-MCNC: 0.4 MG/DL — SIGNIFICANT CHANGE UP (ref 0.2–1.2)
BUN SERPL-MCNC: 10 MG/DL — SIGNIFICANT CHANGE UP (ref 10–20)
CALCIUM SERPL-MCNC: 9.1 MG/DL — SIGNIFICANT CHANGE UP (ref 8.4–10.5)
CANCER AG125 SERPL-ACNC: 12 U/ML — SIGNIFICANT CHANGE UP
CANCER AG19-9 SERPL-ACNC: 21 U/ML — SIGNIFICANT CHANGE UP
CEA SERPL-MCNC: 1.7 NG/ML — SIGNIFICANT CHANGE UP (ref 0–3.8)
CHLORIDE SERPL-SCNC: 107 MMOL/L — SIGNIFICANT CHANGE UP (ref 98–110)
CO2 SERPL-SCNC: 21 MMOL/L — SIGNIFICANT CHANGE UP (ref 17–32)
CREAT SERPL-MCNC: 1.1 MG/DL — SIGNIFICANT CHANGE UP (ref 0.7–1.5)
EGFR: 55 ML/MIN/1.73M2 — LOW
EOSINOPHIL # BLD AUTO: 0.24 K/UL — SIGNIFICANT CHANGE UP (ref 0–0.7)
EOSINOPHIL NFR BLD AUTO: 2.5 % — SIGNIFICANT CHANGE UP (ref 0–8)
GLUCOSE SERPL-MCNC: 90 MG/DL — SIGNIFICANT CHANGE UP (ref 70–99)
HCG-TM SERPL-ACNC: 1 MIU/ML — SIGNIFICANT CHANGE UP
HCT VFR BLD CALC: 31.5 % — LOW (ref 37–47)
HGB BLD-MCNC: 10.7 G/DL — LOW (ref 12–16)
IMM GRANULOCYTES NFR BLD AUTO: 0.4 % — HIGH (ref 0.1–0.3)
LYMPHOCYTES # BLD AUTO: 2.28 K/UL — SIGNIFICANT CHANGE UP (ref 1.2–3.4)
LYMPHOCYTES # BLD AUTO: 23.9 % — SIGNIFICANT CHANGE UP (ref 20.5–51.1)
MAGNESIUM SERPL-MCNC: 1.4 MG/DL — LOW (ref 1.8–2.4)
MCHC RBC-ENTMCNC: 30.3 PG — SIGNIFICANT CHANGE UP (ref 27–31)
MCHC RBC-ENTMCNC: 34 G/DL — SIGNIFICANT CHANGE UP (ref 32–37)
MCV RBC AUTO: 89.2 FL — SIGNIFICANT CHANGE UP (ref 81–99)
MONOCYTES # BLD AUTO: 0.74 K/UL — HIGH (ref 0.1–0.6)
MONOCYTES NFR BLD AUTO: 7.8 % — SIGNIFICANT CHANGE UP (ref 1.7–9.3)
NEUTROPHILS # BLD AUTO: 6.17 K/UL — SIGNIFICANT CHANGE UP (ref 1.4–6.5)
NEUTROPHILS NFR BLD AUTO: 64.8 % — SIGNIFICANT CHANGE UP (ref 42.2–75.2)
NRBC # BLD: 0 /100 WBCS — SIGNIFICANT CHANGE UP (ref 0–0)
PHOSPHATE SERPL-MCNC: 3.9 MG/DL — SIGNIFICANT CHANGE UP (ref 2.1–4.9)
PLATELET # BLD AUTO: 264 K/UL — SIGNIFICANT CHANGE UP (ref 130–400)
PMV BLD: 11.2 FL — HIGH (ref 7.4–10.4)
POTASSIUM SERPL-MCNC: 4.2 MMOL/L — SIGNIFICANT CHANGE UP (ref 3.5–5)
POTASSIUM SERPL-SCNC: 4.2 MMOL/L — SIGNIFICANT CHANGE UP (ref 3.5–5)
PROT SERPL-MCNC: 6.4 G/DL — SIGNIFICANT CHANGE UP (ref 6–8)
RBC # BLD: 3.53 M/UL — LOW (ref 4.2–5.4)
RBC # FLD: 12.5 % — SIGNIFICANT CHANGE UP (ref 11.5–14.5)
SODIUM SERPL-SCNC: 139 MMOL/L — SIGNIFICANT CHANGE UP (ref 135–146)
WBC # BLD: 9.53 K/UL — SIGNIFICANT CHANGE UP (ref 4.8–10.8)
WBC # FLD AUTO: 9.53 K/UL — SIGNIFICANT CHANGE UP (ref 4.8–10.8)

## 2024-09-29 PROCEDURE — 99239 HOSP IP/OBS DSCHRG MGMT >30: CPT

## 2024-09-29 RX ADMIN — Medication 200 MILLIGRAM(S): at 05:18

## 2024-09-29 RX ADMIN — Medication 100 MILLIGRAM(S): at 01:34

## 2024-09-29 RX ADMIN — PANTOPRAZOLE SODIUM 40 MILLIGRAM(S): 40 TABLET, DELAYED RELEASE ORAL at 05:18

## 2024-09-29 RX ADMIN — Medication 100 MILLIGRAM(S): at 05:18

## 2024-09-29 RX ADMIN — ENOXAPARIN SODIUM 40 MILLIGRAM(S): 150 INJECTION SUBCUTANEOUS at 11:18

## 2024-09-29 NOTE — PROGRESS NOTE ADULT - ASSESSMENT
# Transient septic shock present on admission likely from transient bacteremia after EGD w/ lg (2cm) duod polypectomy s/p clipping  s/p EGD with duodenal polyp EMR 9/26: Normal mucosa in the whole esophagus. Erythema in the stomach compatible with non-erosive gastritis. Hill Grade 4, Hiatal Hernia. A 2cm sessile duodenal polyp was seen in the proximal bulb. (Polypectomy, Endoclip). Normal mucosa in the Duodenal bulb and Second part of duodenum.  f/u EGD path:   downgraded quickly to floor  off pressors  looks well  Diet: reg - tolerating - no pain  IVFs: stop  abx: cipro 400mg iv q12 + flagyl 500mg po q8  f/u BCx x2  WBC back to nl - no need to follow if improving  avoid NSAID for pain - can use tylenol vs ultram or oxycod po vs morphine iv  PPI po q24  GI noted    # LUCIAN  peak Cr 1.4  base Cr nl (< 1)  oral hydration  BMP chyna    # constipation  last BM Wed  pt did NOT want anything  she feels she will go after she eats some food    # Hiatal Hernia    # hypomagnesemia  replete    # Pelvic cystic Mass 16.7cm - likely cause of on/off abd pain that was present BEFORE EGD  seems benign - prob rt ovarian cystadenoma  GYN noted  CT noted  Pelvic U/S done - f/u read  Tumor Markers: Inhib A/B; CEA; LDH; AFP; B-HCG; CA 19-9;  - can order now and f/u outpt  outpt f/u Gyn Onc  mass has nothing to do w/ the sepsis    # Ho HTN   meds on hold until BP higher    # DVT ppx: LMWH    # GI ppx: PPI po    # Activity: independent    # updated  and dtr at bedside - answered all ques    Dispo: f/u EGD path; f/u pelvic U/S; f/u tumor markers; BMP; abx; f/u BCx; f/u GI  eventually, pt will go home on oral abx - f/u CM - anticipate d/c in 24-48 hrs    Prog is improving.
# Transient septic shock present on admission likely from transient bacteremia after EGD w/ lg (2cm) duod polypectomy s/p clipping  s/p EGD with duodenal polyp EMR 9/26: Normal mucosa in the whole esophagus. Erythema in the stomach compatible with non-erosive gastritis. Hill Grade 4, Hiatal Hernia. A 2cm sessile duodenal polyp was seen in the proximal bulb. (Polypectomy, Endoclip). Normal mucosa in the Duodenal bulb and Second part of duodenum.  f/u EGD path: as outpt  downgraded quickly to floor  off pressors  looks well  Diet: reg - tolerating - no pain  IVFs: stop  abx: cipro 400mg iv q12 + flagyl 500mg po q8 -> upon d/c, levaquin 750mg po q24 x7days total abx (empiric tx)  BCx x2: neg  WBC back to nl - no need to follow if improving  avoid NSAID for pain - can use tylenol   PPI po q24  GI noted    # LUCIAN  peak Cr 1.4  base Cr nl (< 1)  oral hydration    # hypomagnesemia  replete (oral also OK)    # constipation  last BM Wed  pt did NOT want anything  she feels she will go after she eats some food    # Hiatal Hernia    # Pelvic cystic Mass 16.7cm - likely cause of on/off abd pain that was present BEFORE EGD  seems benign - prob rt ovarian cystadenoma  GYN noted  CT noted  Pelvic U/S: 15.8cm septated cystic mass w/ mural nod and assoc dopp flow  CEA 1.7; ; B-HCG 1; CA 19-9: 21; : 12   Tumor Markers: Inhib A/B; AFP - can f/u outpt  outpt f/u Gyn Onc  mass has nothing to do w/ the sepsis, but could be causing chr pain  need to r/o malign - pt and  aware    # Ho HTN   meds on hold until BP higher  can resume w/ PMD    # DVT ppx: LMWH    # GI ppx: PPI po    # Activity: independent    # updated husbandat bedside    Dispo: f/u EGD path; f/u tumor markers; abx; f/u GI - outpt  today, pt will go home on oral abx - f/u CM     Prog is improving.

## 2024-09-29 NOTE — PROGRESS NOTE ADULT - SUBJECTIVE AND OBJECTIVE BOX
ELDA COLINDRES  69y  Female  ***My note supersedes ALL resident notes that I sign.  My corrections for their notes are in my note.***    I can be reached directly on [employer] [id number]. My office number is [phone]. My personal cell number is [phone].    INTERVAL EVENTS: Here for f/u of fever. Pt feels very well today and ready to go home.    T(F): 98.7 (09-29-24 @ 12:29), Max: 99.6 (09-29-24 @ 05:01)  HR: 71 (09-29-24 @ 12:29) (71 - 78)  BP: 127/64 (09-29-24 @ 12:29) (122/72 - 134/81)  RR: 17 (09-29-24 @ 12:29) (17 - 18)  SpO2: 97% (09-29-24 @ 12:29) (96% - 98%)    Gen: NAD  HEENT: PERRL, EOMI, mouth clr, nose clr  Neck: no nodes, no JVD, thyroid nl  lungs: clr  hrt: s1 s2 rrr no murmur  abd: soft, NT/ND, no HS megaly; can subtly feel abd/pelv mass in lower abd  ext: no edema, no c/c  neuro: aa ox3, cn intact, can move all 4 ext    LABS:                      10.7    (    89.2   9.53  )-----------( ---------      264      ( 29 Sep 2024 07:47 )             31.5    (    12.5     Hemoglobin: 10.7 g/dL (09-29 @ 07:47)  Hemoglobin: 9.8 g/dL (09-28 @ 08:55)  Hemoglobin: 10.1 g/dL (09-27 @ 17:06)  Hemoglobin: 10.0 g/dL (09-27 @ 11:19)  Hemoglobin: 12.0 g/dL (09-26 @ 23:35)    139   (   107   (   90      09-29-24 @ 07:47  ----------------------               4.2   (   21   (   10                             -----                        1.1  Ca  9.1   Mg  1.4    P   3.9     LFT  6.4  (  0.4  (  20       09-29-24 @ 07:47  -------------------------  3.7  (  84  (  14    Urinalysis Basic - ( 29 Sep 2024 07:47 )    Color: x / Appearance: x / SG: x / pH: x  Gluc: 90 mg/dL / Ketone: x  / Bili: x / Urobili: x   Blood: x / Protein: x / Nitrite: x   Leuk Esterase: x / RBC: x / WBC x   Sq Epi: x / Non Sq Epi: x / Bacteria: x    Culture - Blood (collected 09-27-24 @ 04:33)  Source: .Blood BLOOD  Preliminary Report (09-29-24 @ 14:00):    No growth at 48 Hours    Culture - Blood (collected 09-27-24 @ 04:33)  Source: .Blood BLOOD  Preliminary Report (09-29-24 @ 14:00):    No growth at 48 Hours    RADIOLOGY & ADDITIONAL TESTS:  < from: US Pelvis Complete (US Pelvis Complete .) (09.28.24 @ 09:50) >  IMPRESSION:    Septated cystic mass measuring 15.8 x 12.1 x 10.1 cm pelvic cyst with   mural nodularity and associated Doppler flow.    Nonvisualization of bilateral ovaries.    < end of copied text >    MEDICATIONS:  ciprofloxacin   IVPB 400 milliGRAM(s) IV Intermittent every 12 hours  metroNIDAZOLE  IVPB      metroNIDAZOLE  IVPB 500 milliGRAM(s) IV Intermittent every 8 hours    acetaminophen     Tablet .. 650 milliGRAM(s) Oral every 6 hours PRN  enoxaparin Injectable 40 milliGRAM(s) SubCutaneous every 24 hours  ondansetron Injectable 4 milliGRAM(s) IV Push every 8 hours PRN  pantoprazole    Tablet 40 milliGRAM(s) Oral before breakfast  
  ELDA COLINDRES  69y  Female  ***My note supersedes ALL resident notes that I sign.  My corrections for their notes are in my note.***    I can be reached directly on [employer] [phone]. My office number is [phone]. My personal cell number is [phone].    INTERVAL EVENTS: Here for f/u of transient sepsis. Pt feels fine. She has on/off abd pain for many mo now - prob related to pelvic cyst on CT. Pt is eating and drinking. Pt will f/u GYN outpt.    T(F): 98.2 (09-28-24 @ 04:58), Max: 98.3 (09-28-24 @ 01:08)  HR: 89 (09-28-24 @ 04:58) (68 - 89)  BP: 111/62 (09-28-24 @ 04:58) (107/57 - 147/79)  RR: 18 (09-28-24 @ 04:58) (16 - 18)  SpO2: 97% (09-28-24 @ 04:58) (96% - 99%)    Gen: NAD  HEENT: PERRL, EOMI, mouth clr, nose clr  Neck: no nodes, no JVD, thyroid nl  lungs: clr  hrt: s1 s2 rrr no murmur  abd: soft, NT/ND, no HS megaly  ext: no edema, no c/c  neuro: aa ox3, cn intact, can move all 4 ext    LABS:                      9.8     (    89.3   9.37  )-----------( ---------      200      ( 28 Sep 2024 08:55 )             29.1    (    12.6     WBC Count: 9.37 K/uL (09-28-24 @ 08:55) - better  WBC Count: 13.69 K/uL (09-27-24 @ 17:06)  WBC Count: 17.77 K/uL (09-27-24 @ 11:19)  WBC Count: 12.17 K/uL (09-26-24 @ 23:35)    Hemoglobin: 9.8 g/dL (09-28 @ 08:55) - mild-mod decr  Hemoglobin: 10.1 g/dL (09-27 @ 17:06)  Hemoglobin: 10.0 g/dL (09-27 @ 11:19)  Hemoglobin: 12.0 g/dL (09-26 @ 23:35)    139   (   108   (   105      09-28-24 @ 08:55  ----------------------               3.8   (   22   (   10                             -----                        1.1  Ca  8.7   Mg  1.6    P   3.4     141   (   108   (   90      09-27-24 @ 17:06  ----------------------               4.0   (   22   (   14                             -----                        1.2  Ca  8.7   Mg  --    P   --     LFT  5.4  (  0.4  (  16       09-28-24 @ 08:55  -------------------------  3.2  (  74  (  13    PT/INR - ( 27 Sep 2024 04:33 )   PT: 13.00 sec;   INR: 1.14 ratio    PTT - ( 27 Sep 2024 04:33 )  PTT: 30.8 sec    Urinalysis Basic - ( 28 Sep 2024 08:55 )    Color: x / Appearance: x / SG: x / pH: x  Gluc: 105 mg/dL / Ketone: x  / Bili: x / Urobili: x   Blood: x / Protein: x / Nitrite: x   Leuk Esterase: x / RBC: x / WBC x   Sq Epi: x / Non Sq Epi: x / Bacteria: x    RADIOLOGY & ADDITIONAL TESTS:  < from: CT Abdomen and Pelvis w/ IV Cont (09.27.24 @ 01:31) >  LOWER CHEST: Patchy consolidated density in the lingula possibly   atelectasis or scarring. Infection cannot be entirely excluded. Small   type III combined hiatal hernia.    LIVER: Mild steatosis  BILE DUCTS: Normal caliber.  GALLBLADDER: Within normal limits.  SPLEEN: Within normal limits.  PANCREAS: Within normal limits.  ADRENALS: Within normal limits.  KIDNEYS/URETERS: Symmetric renal enhancement. No hydronephrosis    BLADDER: Moderately distended bladder was otherwise normal in appearance.  REPRODUCTIVE ORGANS: Midline large cystic structure abutting the uterine   fundus along its inferior aspect and extending well into the abdomen   superior to the umbilicus. Etiology not entirely certain though highly   favor ovarian origin as a completely cystic exophytic uterine fibroid in   otherwise normal appearing uterus would be unusual. Given the midline   location and no definite visualization of the ovaries is unclear which   side this is arising from however there is small suggestion that a normal   left adnexa can be visualized (series 3 image 110) therefore favor right   ovarian origin. Cystic structure is entirely homogeneous fluid   attenuation with no thickened wall, septations or nodular components   appreciated on CT. This measures approximately 11.8 x 10.4 x 16.7 cm    BOWEL: No bowel obstruction. Appendix is normal. Colonic diverticulosis   without evidence of acute diverticulitis. Linear densities noted in the   gastric pyloric/duodenal bulb region possibly representing endoscopic   clips given the history of EGD earlier the same day.  PERITONEUM/RETROPERITONEUM: Within normal limits.  VESSELS: Mild atherosclerosis. No abdominal aortic aneurysm.  LYMPH NODES: No lymphadenopathy.  ABDOMINAL WALL: Tiny fat-containing umbilical hernia.  BONES: Degenerative changes along the vertebral column. Small disc   osteophyte complex noted at L5-S1.    IMPRESSION:  1.  No definite evidence of acute abdominal pelvic pathology.  2.  Large midline cystic structure in the pelvis extending well into the   midabdomen as described above. Although exact etiology cannot be certain   based on CT favor ovarian and as described above possibly right ovarian.   Given the size of this lesion favor ovarian cystadenoma. No definite   complexity seen on CT. No wall thickening, complex septations or nodular   components. Appears homogeneously cystic. It measures approximately 11.8   x 10.4 x 16.7 cm. Gynecologic consultation would be advised which can be   done as anoutpatient unless there are other clinical concerns.  3.  Additional incidental findings as above.    < end of copied text >    < from: Xray Chest 1 View- PORTABLE-Urgent (09.27.24 @ 00:12) >  Impression:    Patchy lingular/left basilar opacities, compatible with pneumonia in the   appropriate clinical setting. Recommend follow-up to resolution.    < end of copied text >    MEDICATIONS:  ciprofloxacin   IVPB 400 milliGRAM(s) IV Intermittent every 12 hours  metroNIDAZOLE  IVPB      metroNIDAZOLE  IVPB 500 milliGRAM(s) IV Intermittent every 8 hours    acetaminophen     Tablet .. 650 milliGRAM(s) Oral every 6 hours PRN  enoxaparin Injectable 40 milliGRAM(s) SubCutaneous every 24 hours  ondansetron Injectable 4 milliGRAM(s) IV Push every 8 hours PRN  pantoprazole    Tablet 40 milliGRAM(s) Oral before breakfast

## 2024-09-29 NOTE — PROGRESS NOTE ADULT - TIME BILLING
d/c plan
1st day w pt    answered all ques for family    complex chart review    complex care coord    cond a potential threat to life

## 2024-09-30 LAB
INHIBIN A SERPL-MCNC: 2.6 PG/ML — SIGNIFICANT CHANGE UP
INHIBIN B SERUM: <7 PG/ML — SIGNIFICANT CHANGE UP (ref 0–16.9)

## 2024-10-02 LAB
CULTURE RESULTS: SIGNIFICANT CHANGE UP
CULTURE RESULTS: SIGNIFICANT CHANGE UP
SPECIMEN SOURCE: SIGNIFICANT CHANGE UP
SPECIMEN SOURCE: SIGNIFICANT CHANGE UP
SURGICAL PATHOLOGY STUDY: SIGNIFICANT CHANGE UP

## 2024-10-03 ENCOUNTER — APPOINTMENT (OUTPATIENT)
Dept: GYNECOLOGIC ONCOLOGY | Facility: CLINIC | Age: 69
End: 2024-10-03
Payer: COMMERCIAL

## 2024-10-03 VITALS
SYSTOLIC BLOOD PRESSURE: 126 MMHG | DIASTOLIC BLOOD PRESSURE: 93 MMHG | BODY MASS INDEX: 34.15 KG/M2 | HEIGHT: 64 IN | HEART RATE: 104 BPM | WEIGHT: 200 LBS

## 2024-10-03 DIAGNOSIS — K31.7 POLYP OF STOMACH AND DUODENUM: ICD-10-CM

## 2024-10-03 DIAGNOSIS — Z86.79 PERSONAL HISTORY OF OTHER DISEASES OF THE CIRCULATORY SYSTEM: ICD-10-CM

## 2024-10-03 DIAGNOSIS — K44.9 DIAPHRAGMATIC HERNIA WITHOUT OBSTRUCTION OR GANGRENE: ICD-10-CM

## 2024-10-03 DIAGNOSIS — E78.5 HYPERLIPIDEMIA, UNSPECIFIED: ICD-10-CM

## 2024-10-03 DIAGNOSIS — Z86.39 PERSONAL HISTORY OF OTHER ENDOCRINE, NUTRITIONAL AND METABOLIC DISEASE: ICD-10-CM

## 2024-10-03 DIAGNOSIS — I10 ESSENTIAL (PRIMARY) HYPERTENSION: ICD-10-CM

## 2024-10-03 PROCEDURE — 99204 OFFICE O/P NEW MOD 45 MIN: CPT

## 2024-10-03 PROCEDURE — 99459 PELVIC EXAMINATION: CPT

## 2024-10-03 NOTE — HISTORY OF PRESENT ILLNESS
[FreeTextEntry1] : 69-year-old female para 2, status post upper endoscopy with benign finding, since postoperatively patient experienced symptoms suggestive of infection/sepsis, patient presented to the emergency room where she underwent imaging studies and was admitted for antibiotic treatment, the imaging studies revealed large adnexal/ovarian cyst appears to be 9 by imaging study.  Imaging studies also indicated the finding of diverticulosis. Patient was referred for management.

## 2024-10-03 NOTE — OB HISTORY
[Total Preg ___] : : [unfilled] [Full Term ___] : [unfilled] (full-term) [ ___] : [unfilled]  section delivery(s) [unknown] : the patient is unsure of the date of her LMP

## 2024-10-03 NOTE — PHYSICAL EXAM
[Chaperone Present] : A chaperone was present in the examining room during all aspects of the physical examination [62655] : A chaperone was present during the pelvic exam. [FreeTextEntry2] : Aga  [Normal] : Anus and perineum: Normal sphincter tone, no masses, no prolapse. [de-identified] : Soft, nontender, nondistended, surgical incision scar noted. [de-identified] : Examination is limited and pelvic structure but not palpable on bimanual examination [Fully active, able to carry on all pre-disease performance without restriction] : Status 0 - Fully active, able to carry on all pre-disease performance without restriction

## 2024-10-03 NOTE — ASSESSMENT
[FreeTextEntry1] : 69-year-old female, with large pelvic mass was seen as part of workup for post colonoscopy infection/sepsis.  Patient was referred for management.  I discussed with patient differential diagnosis of her pelvic mass including malignancy however the finding by imaging study points in the direction of a benign cyst, however the only way to rule out malignancy is surgical intervention, and given the size of the cyst, surgical intervention is recommended. I discussed with patient surgical management for the removal of the uterus with cervix bilateral fallopian tubes and ovaries and to perform complete surgical staging if malignancy is encountered intraoperatively. Discussed with patient surgery to be performed by minimally invasive/robotic approach with removal of the uterus with cervix and bilateral fallopian tubes and ovaries. Explained to the patient surgery has inherent risks including but not limited to infection, bleeding which may require blood transfusion, DVT and or PE and although all measures will be taken to minimize these risks that cannot be eliminated 100% of the time. Additionally there is a risk of injury to surrounding structures such as bladder, ureters, bowel or blood vessels and these injuries will be repaired immediately however some injuries may not present until postoperatively and may require reoperations. The risk of injury to surrounding structures is about 1 to 2%.  Benefit of surgery is to exclude malignancy of the ovary or malignancy is encountered to provide appropriate treatment as well as appropriate adjuvant treatment. Understanding above, patient elected to proceed with recommendation will be scheduled for robotic hysterectomy with bilateral salpingectomy and possible oophorectomy. 55 minutes was spent with the patient during this visit. Patient to be seen by her primary care physician for optimization and clearance for surgery.

## 2024-10-07 DIAGNOSIS — N17.9 ACUTE KIDNEY FAILURE, UNSPECIFIED: ICD-10-CM

## 2024-10-07 DIAGNOSIS — T81.44XA SEPSIS FOLLOWING A PROCEDURE, INITIAL ENCOUNTER: ICD-10-CM

## 2024-10-07 DIAGNOSIS — K76.0 FATTY (CHANGE OF) LIVER, NOT ELSEWHERE CLASSIFIED: ICD-10-CM

## 2024-10-07 DIAGNOSIS — Z95.1 PRESENCE OF AORTOCORONARY BYPASS GRAFT: ICD-10-CM

## 2024-10-07 DIAGNOSIS — I25.10 ATHEROSCLEROTIC HEART DISEASE OF NATIVE CORONARY ARTERY WITHOUT ANGINA PECTORIS: ICD-10-CM

## 2024-10-07 DIAGNOSIS — K59.00 CONSTIPATION, UNSPECIFIED: ICD-10-CM

## 2024-10-07 DIAGNOSIS — K44.9 DIAPHRAGMATIC HERNIA WITHOUT OBSTRUCTION OR GANGRENE: ICD-10-CM

## 2024-10-07 DIAGNOSIS — I10 ESSENTIAL (PRIMARY) HYPERTENSION: ICD-10-CM

## 2024-10-07 DIAGNOSIS — T81.12XA POSTPROCEDURAL SEPTIC SHOCK, INITIAL ENCOUNTER: ICD-10-CM

## 2024-10-07 DIAGNOSIS — E11.9 TYPE 2 DIABETES MELLITUS WITHOUT COMPLICATIONS: ICD-10-CM

## 2024-10-07 DIAGNOSIS — T81.40XA INFECTION FOLLOWING A PROCEDURE, UNSPECIFIED, INITIAL ENCOUNTER: ICD-10-CM

## 2024-10-07 DIAGNOSIS — K21.9 GASTRO-ESOPHAGEAL REFLUX DISEASE WITHOUT ESOPHAGITIS: ICD-10-CM

## 2024-10-07 DIAGNOSIS — E78.5 HYPERLIPIDEMIA, UNSPECIFIED: ICD-10-CM

## 2024-10-07 DIAGNOSIS — D27.0 BENIGN NEOPLASM OF RIGHT OVARY: ICD-10-CM

## 2024-10-07 DIAGNOSIS — E83.42 HYPOMAGNESEMIA: ICD-10-CM

## 2024-10-07 DIAGNOSIS — A41.9 SEPSIS, UNSPECIFIED ORGANISM: ICD-10-CM

## 2024-10-11 ENCOUNTER — OUTPATIENT (OUTPATIENT)
Dept: OUTPATIENT SERVICES | Facility: HOSPITAL | Age: 69
LOS: 1 days | End: 2024-10-11
Payer: COMMERCIAL

## 2024-10-11 VITALS
WEIGHT: 194.01 LBS | HEIGHT: 65 IN | TEMPERATURE: 97 F | SYSTOLIC BLOOD PRESSURE: 102 MMHG | OXYGEN SATURATION: 98 % | RESPIRATION RATE: 16 BRPM | DIASTOLIC BLOOD PRESSURE: 72 MMHG | HEART RATE: 76 BPM

## 2024-10-11 DIAGNOSIS — Z01.818 ENCOUNTER FOR OTHER PREPROCEDURAL EXAMINATION: ICD-10-CM

## 2024-10-11 DIAGNOSIS — R19.00 INTRA-ABDOMINAL AND PELVIC SWELLING, MASS AND LUMP, UNSPECIFIED SITE: ICD-10-CM

## 2024-10-11 DIAGNOSIS — Z87.828 PERSONAL HISTORY OF OTHER (HEALED) PHYSICAL INJURY AND TRAUMA: Chronic | ICD-10-CM

## 2024-10-11 DIAGNOSIS — Z98.891 HISTORY OF UTERINE SCAR FROM PREVIOUS SURGERY: Chronic | ICD-10-CM

## 2024-10-11 PROCEDURE — 36415 COLL VENOUS BLD VENIPUNCTURE: CPT

## 2024-10-11 PROCEDURE — 99214 OFFICE O/P EST MOD 30 MIN: CPT | Mod: 25

## 2024-10-11 PROCEDURE — 86900 BLOOD TYPING SEROLOGIC ABO: CPT

## 2024-10-11 PROCEDURE — 93005 ELECTROCARDIOGRAM TRACING: CPT

## 2024-10-11 PROCEDURE — 93010 ELECTROCARDIOGRAM REPORT: CPT

## 2024-10-11 PROCEDURE — 86850 RBC ANTIBODY SCREEN: CPT

## 2024-10-11 PROCEDURE — 86901 BLOOD TYPING SEROLOGIC RH(D): CPT

## 2024-10-11 NOTE — H&P PST ADULT - NSICDXPASTMEDICALHX_GEN_ALL_CORE_FT
PAST MEDICAL HISTORY:  Exposure to COVID-19 virus     History of chronic cough     Hyperlipidemia     Hypertension     YURIDIA on CPAP

## 2024-10-11 NOTE — H&P PST ADULT - HISTORY OF PRESENT ILLNESS
69yr old female states after her EGD and polyp removal developed intense abd pain -[facility] admission - IV abx 9/28/2024? - wk up included CT abd -which revealed a large pelvic mass - presents to PAST in prep for EXAM UNDER ANESTHESIA, ROBOTIC TOTAL LAPAROSCOPIC HYSTERECTOMY, BILATERAL SALPINGECTOMY, POSSIBLE OOPHORECTOMY, ALL OTHER INDICATED PROCEDURES. Denies fever, dysuria, abd pain. Chronic cough for several months, under care of Pulm --taking daily  LABA/ICS with improvement.   Anesthesia Alert  YES--Difficult Airway class 4  NO--History of neck surgery or radiation  NO--Limited ROM of neck  NO--History of Malignant hyperthermia  NO--Personal or family history of Pseudocholinesterase deficiency  NO--Prior Anesthesia Complication  NO--Latex Allergy  NO--Loose teeth  NO--History of Rheumatoid Arthritis  yes--YURIDIA  No Bleeding risk  NO--Other_____   Duke Activity Status Index (DASI) from [employer]  on 10/11/2024  ** All calculations should be rechecked by clinician prior to use **    RESULT SUMMARY:  30.2 points  The higher the score (maximum 58.2), the higher the functional status.    6.45 METs        INPUTS:  Take care of self —> 2.75 = Yes  Walk indoors —> 1.75 = Yes  Walk 1&ndash;2 blocks on level ground —> 2.75 = Yes  Climb a flight of stairs or walk up a hill —> 5.5 = Yes  Run a short distance —> 0 = No  Do light work around the house —> 2.7 = Yes  Do moderate work around the house —> 3.5 = Yes  Do heavy work around the house —> 0 = No  Do yardwork —> 0 = No  Have sexual relations —> 5.25 = Yes  Participate in moderate recreational activities —> 6 = Yes  Participate in strenuous sports —> 0 = No  Revised Cardiac Risk Index for Pre-Operative Risk from [employer]  on 10/11/2024  ** All calculations should be rechecked by clinician prior to use **    RESULT SUMMARY:  1 points  Class II Risk    6.0 %  30-day risk of death, MI, or cardiac arrest    From [employer] 2017. These numbers are higher than those from the original study (Ray 1999). See Evidence for details.      INPUTS:  Elevated-risk surgery —> 1 = Yes  History of ischemic heart disease —> 0 = No  History of congestive heart failure —> 0 = No  History of cerebrovascular disease —> 0 = No  Pre-operative treatment with insulin —> 0 = No  Pre-operative creatinine >2 mg/dL / 176.8 µmol/L —> 0 = No     69yr old female states after her EGD and polyp removal developed intense abd pain -[facility] admission - IV abx 9/28/2024? - wk up included CT abd -which revealed a large pelvic mass - presents to PAST in prep for EXAM UNDER ANESTHESIA, ROBOTIC TOTAL LAPAROSCOPIC HYSTERECTOMY, BILATERAL SALPINGECTOMY, POSSIBLE OOPHORECTOMY, ALL OTHER INDICATED PROCEDURES. Denies fever, dysuria, abd pain. Chronic cough for several months, under care of Pulm --taking daily  LABA/ICS with improvement..  See pulm note HIE 8/27/2024.  Anesthesia Alert  YES--Difficult Airway class 4  NO--History of neck surgery or radiation  NO--Limited ROM of neck  NO--History of Malignant hyperthermia  NO--Personal or family history of Pseudocholinesterase deficiency  NO--Prior Anesthesia Complication  NO--Latex Allergy  NO--Loose teeth  NO--History of Rheumatoid Arthritis  yes--YURIDIA  No Bleeding risk  NO--Other_____   Duke Activity Status Index (DASI) from [employer]  on 10/11/2024  ** All calculations should be rechecked by clinician prior to use **    RESULT SUMMARY:  30.2 points  The higher the score (maximum 58.2), the higher the functional status.    6.45 METs        INPUTS:  Take care of self —> 2.75 = Yes  Walk indoors —> 1.75 = Yes  Walk 1&ndash;2 blocks on level ground —> 2.75 = Yes  Climb a flight of stairs or walk up a hill —> 5.5 = Yes  Run a short distance —> 0 = No  Do light work around the house —> 2.7 = Yes  Do moderate work around the house —> 3.5 = Yes  Do heavy work around the house —> 0 = No  Do yardwork —> 0 = No  Have sexual relations —> 5.25 = Yes  Participate in moderate recreational activities —> 6 = Yes  Participate in strenuous sports —> 0 = No  Revised Cardiac Risk Index for Pre-Operative Risk from [employer]  on 10/11/2024  ** All calculations should be rechecked by clinician prior to use **    RESULT SUMMARY:  1 points  Class II Risk    6.0 %  30-day risk of death, MI, or cardiac arrest    From [employer] 2017. These numbers are higher than those from the original study (Ray 1999). See Evidence for details.      INPUTS:  Elevated-risk surgery —> 1 = Yes  History of ischemic heart disease —> 0 = No  History of congestive heart failure —> 0 = No  History of cerebrovascular disease —> 0 = No  Pre-operative treatment with insulin —> 0 = No  Pre-operative creatinine >2 mg/dL / 176.8 µmol/L —> 0 = No

## 2024-10-12 DIAGNOSIS — Z01.818 ENCOUNTER FOR OTHER PREPROCEDURAL EXAMINATION: ICD-10-CM

## 2024-10-12 DIAGNOSIS — R19.00 INTRA-ABDOMINAL AND PELVIC SWELLING, MASS AND LUMP, UNSPECIFIED SITE: ICD-10-CM

## 2024-10-23 NOTE — ASU PATIENT PROFILE, ADULT - FALL HARM RISK - UNIVERSAL INTERVENTIONS
Bed in lowest position, wheels locked, appropriate side rails in place/Call bell, personal items and telephone in reach/Instruct patient to call for assistance before getting out of bed or chair/Non-slip footwear when patient is out of bed/[place] to call system/Physically safe environment - no spills, clutter or unnecessary equipment/Purposeful Proactive Rounding/Room/bathroom lighting operational, light cord in reach

## 2024-10-23 NOTE — ASU PATIENT PROFILE, ADULT - REASON FOR ADMISSION, PROFILE
Pt. stated, " I'm here for removal of mass in pelvic area covering my ovaries, take out also my uterus and fallopian tubes."

## 2024-10-24 ENCOUNTER — RESULT REVIEW (OUTPATIENT)
Age: 69
End: 2024-10-24

## 2024-10-24 ENCOUNTER — OUTPATIENT (OUTPATIENT)
Dept: OUTPATIENT SERVICES | Facility: HOSPITAL | Age: 69
LOS: 1 days | Discharge: HOME | End: 2024-10-24
Payer: COMMERCIAL

## 2024-10-24 ENCOUNTER — TRANSCRIPTION ENCOUNTER (OUTPATIENT)
Age: 69
End: 2024-10-24

## 2024-10-24 ENCOUNTER — APPOINTMENT (OUTPATIENT)
Dept: GYNECOLOGIC ONCOLOGY | Facility: HOSPITAL | Age: 69
End: 2024-10-24

## 2024-10-24 VITALS
TEMPERATURE: 98 F | OXYGEN SATURATION: 98 % | HEART RATE: 64 BPM | SYSTOLIC BLOOD PRESSURE: 110 MMHG | RESPIRATION RATE: 17 BRPM | DIASTOLIC BLOOD PRESSURE: 57 MMHG

## 2024-10-24 VITALS — OXYGEN SATURATION: 100 %

## 2024-10-24 DIAGNOSIS — R19.00 INTRA-ABDOMINAL AND PELVIC SWELLING, MASS AND LUMP, UNSPECIFIED SITE: ICD-10-CM

## 2024-10-24 DIAGNOSIS — Z87.828 PERSONAL HISTORY OF OTHER (HEALED) PHYSICAL INJURY AND TRAUMA: Chronic | ICD-10-CM

## 2024-10-24 DIAGNOSIS — Z98.891 HISTORY OF UTERINE SCAR FROM PREVIOUS SURGERY: Chronic | ICD-10-CM

## 2024-10-24 LAB
ALBUMIN SERPL ELPH-MCNC: 4.2 G/DL — SIGNIFICANT CHANGE UP (ref 3.5–5.2)
ALP SERPL-CCNC: 101 U/L — SIGNIFICANT CHANGE UP (ref 30–115)
ALT FLD-CCNC: 16 U/L — SIGNIFICANT CHANGE UP (ref 0–41)
ANION GAP SERPL CALC-SCNC: 13 MMOL/L — SIGNIFICANT CHANGE UP (ref 7–14)
AST SERPL-CCNC: 26 U/L — SIGNIFICANT CHANGE UP (ref 0–41)
BASOPHILS # BLD AUTO: 0.05 K/UL — SIGNIFICANT CHANGE UP (ref 0–0.2)
BASOPHILS NFR BLD AUTO: 0.3 % — SIGNIFICANT CHANGE UP (ref 0–1)
BILIRUB SERPL-MCNC: 0.3 MG/DL — SIGNIFICANT CHANGE UP (ref 0.2–1.2)
BUN SERPL-MCNC: 25 MG/DL — HIGH (ref 10–20)
CALCIUM SERPL-MCNC: 9.2 MG/DL — SIGNIFICANT CHANGE UP (ref 8.4–10.5)
CHLORIDE SERPL-SCNC: 104 MMOL/L — SIGNIFICANT CHANGE UP (ref 98–110)
CO2 SERPL-SCNC: 24 MMOL/L — SIGNIFICANT CHANGE UP (ref 17–32)
CREAT SERPL-MCNC: 1.6 MG/DL — HIGH (ref 0.7–1.5)
EGFR: 35 ML/MIN/1.73M2 — LOW
EOSINOPHIL # BLD AUTO: 0.04 K/UL — SIGNIFICANT CHANGE UP (ref 0–0.7)
EOSINOPHIL NFR BLD AUTO: 0.3 % — SIGNIFICANT CHANGE UP (ref 0–8)
GLUCOSE SERPL-MCNC: 149 MG/DL — HIGH (ref 70–99)
HCT VFR BLD CALC: 34.4 % — LOW (ref 37–47)
HGB BLD-MCNC: 11.1 G/DL — LOW (ref 12–16)
IMM GRANULOCYTES NFR BLD AUTO: 0.5 % — HIGH (ref 0.1–0.3)
LYMPHOCYTES # BLD AUTO: 1.15 K/UL — LOW (ref 1.2–3.4)
LYMPHOCYTES # BLD AUTO: 7.7 % — LOW (ref 20.5–51.1)
MAGNESIUM SERPL-MCNC: 1.8 MG/DL — SIGNIFICANT CHANGE UP (ref 1.8–2.4)
MCHC RBC-ENTMCNC: 29 PG — SIGNIFICANT CHANGE UP (ref 27–31)
MCHC RBC-ENTMCNC: 32.3 G/DL — SIGNIFICANT CHANGE UP (ref 32–37)
MCV RBC AUTO: 89.8 FL — SIGNIFICANT CHANGE UP (ref 81–99)
MONOCYTES # BLD AUTO: 0.24 K/UL — SIGNIFICANT CHANGE UP (ref 0.1–0.6)
MONOCYTES NFR BLD AUTO: 1.6 % — LOW (ref 1.7–9.3)
NEUTROPHILS # BLD AUTO: 13.33 K/UL — HIGH (ref 1.4–6.5)
NEUTROPHILS NFR BLD AUTO: 89.6 % — HIGH (ref 42.2–75.2)
NRBC # BLD: 0 /100 WBCS — SIGNIFICANT CHANGE UP (ref 0–0)
PHOSPHATE SERPL-MCNC: 3.8 MG/DL — SIGNIFICANT CHANGE UP (ref 2.1–4.9)
PLATELET # BLD AUTO: 284 K/UL — SIGNIFICANT CHANGE UP (ref 130–400)
PMV BLD: 10.8 FL — HIGH (ref 7.4–10.4)
POTASSIUM SERPL-MCNC: 4.1 MMOL/L — SIGNIFICANT CHANGE UP (ref 3.5–5)
POTASSIUM SERPL-SCNC: 4.1 MMOL/L — SIGNIFICANT CHANGE UP (ref 3.5–5)
PROT SERPL-MCNC: 7.2 G/DL — SIGNIFICANT CHANGE UP (ref 6–8)
RBC # BLD: 3.83 M/UL — LOW (ref 4.2–5.4)
RBC # FLD: 11.9 % — SIGNIFICANT CHANGE UP (ref 11.5–14.5)
SODIUM SERPL-SCNC: 141 MMOL/L — SIGNIFICANT CHANGE UP (ref 135–146)
WBC # BLD: 14.89 K/UL — HIGH (ref 4.8–10.8)
WBC # FLD AUTO: 14.89 K/UL — HIGH (ref 4.8–10.8)

## 2024-10-24 PROCEDURE — 88307 TISSUE EXAM BY PATHOLOGIST: CPT | Mod: 26

## 2024-10-24 PROCEDURE — 88112 CYTOPATH CELL ENHANCE TECH: CPT | Mod: 26

## 2024-10-24 PROCEDURE — C9399: CPT

## 2024-10-24 PROCEDURE — 36415 COLL VENOUS BLD VENIPUNCTURE: CPT

## 2024-10-24 PROCEDURE — S2900: CPT

## 2024-10-24 PROCEDURE — 88305 TISSUE EXAM BY PATHOLOGIST: CPT | Mod: 26,XP

## 2024-10-24 PROCEDURE — 88305 TISSUE EXAM BY PATHOLOGIST: CPT | Mod: 26

## 2024-10-24 PROCEDURE — 85025 COMPLETE CBC W/AUTO DIFF WBC: CPT

## 2024-10-24 PROCEDURE — 84100 ASSAY OF PHOSPHORUS: CPT

## 2024-10-24 PROCEDURE — 88112 CYTOPATH CELL ENHANCE TECH: CPT

## 2024-10-24 PROCEDURE — 88307 TISSUE EXAM BY PATHOLOGIST: CPT

## 2024-10-24 PROCEDURE — 80053 COMPREHEN METABOLIC PANEL: CPT

## 2024-10-24 PROCEDURE — 88305 TISSUE EXAM BY PATHOLOGIST: CPT

## 2024-10-24 PROCEDURE — 58571 TLH W/T/O 250 G OR LESS: CPT

## 2024-10-24 PROCEDURE — S2900 ROBOTIC SURGICAL SYSTEM: CPT | Mod: NC

## 2024-10-24 PROCEDURE — 83735 ASSAY OF MAGNESIUM: CPT

## 2024-10-24 RX ORDER — AZELASTINE HYDROCHLORIDE 205.5 UG/1
1 SPRAY, METERED NASAL
Refills: 0 | DISCHARGE

## 2024-10-24 RX ORDER — PRAVASTATIN SODIUM 20 MG/1
1 TABLET ORAL
Refills: 0 | DISCHARGE

## 2024-10-24 RX ORDER — ONDANSETRON HCL/PF 4 MG/2 ML
4 VIAL (ML) INJECTION ONCE
Refills: 0 | Status: DISCONTINUED | OUTPATIENT
Start: 2024-10-24 | End: 2024-10-24

## 2024-10-24 RX ORDER — BUDESONIDE AND FORMOTEROL FUMARATE DIHYDRATE 80; 4.5 UG/1; UG/1
2 AEROSOL RESPIRATORY (INHALATION)
Refills: 0 | DISCHARGE

## 2024-10-24 RX ORDER — ACETAMINOPHEN 325 MG
1000 TABLET ORAL ONCE
Refills: 0 | Status: COMPLETED | OUTPATIENT
Start: 2024-10-24 | End: 2024-10-24

## 2024-10-24 RX ORDER — KETOROLAC TROMETHAMINE 10 MG/1
30 TABLET, FILM COATED ORAL ONCE
Refills: 0 | Status: DISCONTINUED | OUTPATIENT
Start: 2024-10-24 | End: 2024-10-24

## 2024-10-24 RX ORDER — ACETAMINOPHEN 325 MG
1000 TABLET ORAL ONCE
Refills: 0 | Status: DISCONTINUED | OUTPATIENT
Start: 2024-10-24 | End: 2024-10-24

## 2024-10-24 RX ORDER — HYDROMORPHONE HYDROCHLORIDE 1 MG/ML
1 INJECTION, SOLUTION INTRAMUSCULAR; INTRAVENOUS; SUBCUTANEOUS
Refills: 0 | Status: DISCONTINUED | OUTPATIENT
Start: 2024-10-24 | End: 2024-10-24

## 2024-10-24 RX ORDER — HYDROMORPHONE HYDROCHLORIDE 1 MG/ML
0.5 INJECTION, SOLUTION INTRAMUSCULAR; INTRAVENOUS; SUBCUTANEOUS
Refills: 0 | Status: DISCONTINUED | OUTPATIENT
Start: 2024-10-24 | End: 2024-10-24

## 2024-10-24 RX ORDER — OLMESARTAN MEDOXOMIL-HYDROCHLOROTHIAZIDE 40; 25 MG/1; MG/1
1 TABLET, FILM COATED ORAL
Refills: 0 | DISCHARGE

## 2024-10-24 RX ADMIN — Medication 400 MILLIGRAM(S): at 14:30

## 2024-10-24 RX ADMIN — Medication 5000 UNIT(S): at 06:46

## 2024-10-24 RX ADMIN — KETOROLAC TROMETHAMINE 30 MILLIGRAM(S): 10 TABLET, FILM COATED ORAL at 10:25

## 2024-10-24 RX ADMIN — KETOROLAC TROMETHAMINE 30 MILLIGRAM(S): 10 TABLET, FILM COATED ORAL at 10:55

## 2024-10-24 RX ADMIN — Medication 1000 MILLIGRAM(S): at 06:45

## 2024-10-24 NOTE — ASU DISCHARGE PLAN (ADULT/PEDIATRIC) - CARE PROVIDER_API CALL
Landen Paige.  Gynecologic Oncology  [address] [place] [address], [address]  [place] [address]  Phone: [phone]  Fax: [phone]  Follow Up Time:

## 2024-10-24 NOTE — ASU DISCHARGE PLAN (ADULT/PEDIATRIC) - FINANCIAL ASSISTANCE
[facility] provides services at a reduced cost to those who are determined to be eligible through [facility]’s financial assistance program. Information regarding [facility]’s financial assistance program can be found by going to [email or web address] or by calling [phone].

## 2024-10-24 NOTE — PRE-ANESTHESIA EVALUATION ADULT - WEIGHT IN LBS
MEDICATIONS  (PRN):  diphenhydrAMINE 50 milliGRAM(s) Oral every 6 hours PRN Extrapyramidal prophylaxis  diphenhydrAMINE Injectable 50 milliGRAM(s) IntraMuscular once PRN Extrapyramidal prophylaxis  haloperidol     Tablet 5 milliGRAM(s) Oral every 6 hours PRN moderate psychotic agitation  haloperidol    Injectable 5 milliGRAM(s) IntraMuscular Once PRN severe psychotic agitation  LORazepam     Tablet 1 milliGRAM(s) Oral every 6 hours PRN moderate anxiety due to affective psychosis  LORazepam   Injectable 2 milliGRAM(s) IntraMuscular Once PRN severe psychotic anxiety  traZODone 100 milliGRAM(s) Oral at bedtime PRN Insomnia   MEDICATIONS  (PRN):  diphenhydrAMINE 50 milliGRAM(s) Oral every 6 hours PRN Extrapyramidal prophylaxis  diphenhydrAMINE Injectable 50 milliGRAM(s) IntraMuscular once PRN Extrapyramidal prophylaxis  haloperidol     Tablet 5 milliGRAM(s) Oral every 6 hours PRN moderate psychotic agitation  haloperidol    Injectable 5 milliGRAM(s) IntraMuscular Once PRN severe psychotic agitation  LORazepam     Tablet 1 milliGRAM(s) Oral every 6 hours PRN moderate anxiety due to affective psychosis  LORazepam   Injectable 2 milliGRAM(s) IntraMuscular Once PRN severe psychotic anxiety  traZODone 100 milliGRAM(s) Oral at bedtime PRN Insomnia   MEDICATIONS  (PRN):  diphenhydrAMINE 50 milliGRAM(s) Oral every 6 hours PRN Extrapyramidal prophylaxis  diphenhydrAMINE Injectable 50 milliGRAM(s) IntraMuscular once PRN Extrapyramidal prophylaxis  haloperidol     Tablet 5 milliGRAM(s) Oral every 6 hours PRN moderate psychotic agitation  haloperidol    Injectable 5 milliGRAM(s) IntraMuscular Once PRN severe psychotic agitation  LORazepam     Tablet 1 milliGRAM(s) Oral every 6 hours PRN moderate anxiety due to affective psychosis  LORazepam   Injectable 2 milliGRAM(s) IntraMuscular Once PRN severe psychotic anxiety  traZODone 75 milliGRAM(s) Oral at bedtime PRN insomnia   MEDICATIONS  (PRN):  diphenhydrAMINE 50 milliGRAM(s) Oral every 6 hours PRN Extrapyramidal prophylaxis  diphenhydrAMINE Injectable 50 milliGRAM(s) IntraMuscular once PRN Extrapyramidal prophylaxis  haloperidol     Tablet 5 milliGRAM(s) Oral every 6 hours PRN moderate psychotic agitation  haloperidol    Injectable 5 milliGRAM(s) IntraMuscular Once PRN severe psychotic agitation  LORazepam     Tablet 1 milliGRAM(s) Oral every 6 hours PRN moderate anxiety due to affective psychosis  LORazepam   Injectable 2 milliGRAM(s) IntraMuscular Once PRN severe psychotic anxiety  traZODone 75 milliGRAM(s) Oral at bedtime PRN insomnia   MEDICATIONS  (PRN):  diphenhydrAMINE 50 milliGRAM(s) Oral every 6 hours PRN Extrapyramidal prophylaxis  diphenhydrAMINE Injectable 50 milliGRAM(s) IntraMuscular once PRN Extrapyramidal prophylaxis  haloperidol     Tablet 5 milliGRAM(s) Oral every 6 hours PRN moderate psychotic agitation  haloperidol    Injectable 5 milliGRAM(s) IntraMuscular Once PRN severe psychotic agitation  LORazepam     Tablet 2 milliGRAM(s) Oral every 6 hours PRN moderate psychotic anxiety  LORazepam   Injectable 2 milliGRAM(s) IntraMuscular Once PRN severe psychotic anxiety  traZODone 75 milliGRAM(s) Oral at bedtime PRN insomnia   MEDICATIONS  (PRN):  diphenhydrAMINE 50 milliGRAM(s) Oral every 6 hours PRN Extrapyramidal prophylaxis  diphenhydrAMINE Injectable 50 milliGRAM(s) IntraMuscular once PRN Extrapyramidal prophylaxis  haloperidol     Tablet 5 milliGRAM(s) Oral every 6 hours PRN moderate psychotic agitation  haloperidol    Injectable 5 milliGRAM(s) IntraMuscular Once PRN severe psychotic agitation  LORazepam     Tablet 2 milliGRAM(s) Oral every 6 hours PRN moderate psychotic anxiety  LORazepam   Injectable 2 milliGRAM(s) IntraMuscular Once PRN severe psychotic anxiety  traZODone 75 milliGRAM(s) Oral at bedtime PRN insomnia   195.9 MEDICATIONS  (PRN):  diphenhydrAMINE 50 milliGRAM(s) Oral every 6 hours PRN Extrapyramidal prophylaxis  diphenhydrAMINE Injectable 50 milliGRAM(s) IntraMuscular once PRN Extrapyramidal prophylaxis  haloperidol     Tablet 5 milliGRAM(s) Oral every 6 hours PRN moderate psychotic agitation  haloperidol    Injectable 5 milliGRAM(s) IntraMuscular Once PRN severe psychotic agitation  LORazepam     Tablet 2 milliGRAM(s) Oral every 6 hours PRN moderate psychotic anxiety  LORazepam   Injectable 2 milliGRAM(s) IntraMuscular Once PRN severe psychotic anxiety  traZODone 75 milliGRAM(s) Oral at bedtime PRN insomnia   MEDICATIONS  (PRN):  diphenhydrAMINE 50 milliGRAM(s) Oral every 6 hours PRN Extrapyramidal prophylaxis  diphenhydrAMINE Injectable 50 milliGRAM(s) IntraMuscular once PRN Extrapyramidal prophylaxis  haloperidol     Tablet 5 milliGRAM(s) Oral every 6 hours PRN moderate psychotic agitation  haloperidol    Injectable 5 milliGRAM(s) IntraMuscular Once PRN severe psychotic agitation  LORazepam     Tablet 2 milliGRAM(s) Oral every 6 hours PRN moderate psychotic anxiety  LORazepam   Injectable 2 milliGRAM(s) IntraMuscular Once PRN severe psychotic anxiety  traZODone 75 milliGRAM(s) Oral at bedtime PRN insomnia   MEDICATIONS  (PRN):  diphenhydrAMINE 50 milliGRAM(s) Oral every 6 hours PRN Extrapyramidal prophylaxis  diphenhydrAMINE Injectable 50 milliGRAM(s) IntraMuscular once PRN Extrapyramidal prophylaxis  haloperidol     Tablet 5 milliGRAM(s) Oral every 6 hours PRN moderate psychotic agitation  haloperidol    Injectable 5 milliGRAM(s) IntraMuscular Once PRN severe psychotic agitation  LORazepam     Tablet 1 milliGRAM(s) Oral every 6 hours PRN moderate anxiety due to affective psychosis  LORazepam   Injectable 2 milliGRAM(s) IntraMuscular Once PRN severe psychotic anxiety  traZODone 100 milliGRAM(s) Oral at bedtime PRN Insomnia   MEDICATIONS  (PRN):  diphenhydrAMINE 50 milliGRAM(s) Oral every 6 hours PRN Extrapyramidal prophylaxis  diphenhydrAMINE Injectable 50 milliGRAM(s) IntraMuscular once PRN Extrapyramidal prophylaxis  haloperidol     Tablet 5 milliGRAM(s) Oral every 6 hours PRN moderate psychotic agitation  haloperidol    Injectable 5 milliGRAM(s) IntraMuscular Once PRN severe psychotic agitation  LORazepam     Tablet 1 milliGRAM(s) Oral every 6 hours PRN moderate anxiety due to affective psychosis  LORazepam   Injectable 2 milliGRAM(s) IntraMuscular Once PRN severe psychotic anxiety

## 2024-10-24 NOTE — CHART NOTE - NSCHARTNOTEFT_GEN_A_CORE
PACU ANESTHESIA ADMISSION NOTE      Procedure: Hysterectomy, total, laparoscopic, with salpingo-oophorectomy, for uterus 250 grams or less      Post op diagnosis:  Pelvic mass        __x__  Patent Airway    __x__  Full return of protective reflexes    __x__  Full recovery from anesthesia / back to baseline status    Vitals:  T(C): 36.4 (10-24-24 @ 09:07), Max: 36.5 (10-24-24 @ 06:00)  HR: 73 (10-24-24 @ 09:30) (64 - 91)  BP: 121/56 (10-24-24 @ 09:30) (110/57 - 121/56)  RR: 20 (10-24-24 @ 09:30) (17 - 219)  SpO2: 100% (10-24-24 @ 09:30) (98% - 100%)    Mental Status:  __x__ Awake   ___x__ Alert   _____ Drowsy   _____ Sedated    Nausea/Vomiting:  __x__ NO  ______Yes,   See Post - Op Orders          Pain Scale (0-10):  _____    Treatment: ____ None    __x__ See Post - Op/PCA Orders    Post - Operative Fluids:   ____ Oral   __x__ See Post - Op Orders    Plan: Discharge:   __x__Home       _____Floor     _____Critical Care    _____  Other:_________________    Comments: Patient had smooth intraoperative event, no anesthesia complication.  PACU Vital signs: HR:            BP:        /          RR:             O2 Sat:       %     Temp

## 2024-10-24 NOTE — BRIEF OPERATIVE NOTE - NSICDXBRIEFPROCEDURE_GEN_ALL_CORE_FT
PROCEDURES:  Hysterectomy, total, laparoscopic, with salpingo-oophorectomy, for uterus 250 grams or less 24-Oct-2024 09:38:31  Jae Kunz

## 2024-10-30 DIAGNOSIS — Z99.89 DEPENDENCE ON OTHER ENABLING MACHINES AND DEVICES: ICD-10-CM

## 2024-10-30 DIAGNOSIS — N83.8 OTHER NONINFLAMMATORY DISORDERS OF OVARY, FALLOPIAN TUBE AND BROAD LIGAMENT: ICD-10-CM

## 2024-10-30 DIAGNOSIS — I10 ESSENTIAL (PRIMARY) HYPERTENSION: ICD-10-CM

## 2024-10-30 DIAGNOSIS — Z86.16 PERSONAL HISTORY OF COVID-19: ICD-10-CM

## 2024-10-30 DIAGNOSIS — E78.5 HYPERLIPIDEMIA, UNSPECIFIED: ICD-10-CM

## 2024-10-30 DIAGNOSIS — N84.0 POLYP OF CORPUS UTERI: ICD-10-CM

## 2024-10-30 DIAGNOSIS — N94.89 OTHER SPECIFIED CONDITIONS ASSOCIATED WITH FEMALE GENITAL ORGANS AND MENSTRUAL CYCLE: ICD-10-CM

## 2024-10-30 DIAGNOSIS — G47.33 OBSTRUCTIVE SLEEP APNEA (ADULT) (PEDIATRIC): ICD-10-CM

## 2024-11-13 ENCOUNTER — APPOINTMENT (OUTPATIENT)
Dept: PULMONOLOGY | Facility: CLINIC | Age: 69
End: 2024-11-13
Payer: COMMERCIAL

## 2024-11-13 VITALS
DIASTOLIC BLOOD PRESSURE: 60 MMHG | WEIGHT: 190 LBS | RESPIRATION RATE: 14 BRPM | HEART RATE: 104 BPM | BODY MASS INDEX: 32.61 KG/M2 | SYSTOLIC BLOOD PRESSURE: 120 MMHG | OXYGEN SATURATION: 98 %

## 2024-11-13 DIAGNOSIS — R09.82 POSTNASAL DRIP: ICD-10-CM

## 2024-11-13 DIAGNOSIS — G47.33 OBSTRUCTIVE SLEEP APNEA (ADULT) (PEDIATRIC): ICD-10-CM

## 2024-11-13 DIAGNOSIS — R91.1 SOLITARY PULMONARY NODULE: ICD-10-CM

## 2024-11-13 DIAGNOSIS — R05.3 CHRONIC COUGH: ICD-10-CM

## 2024-11-13 PROCEDURE — 99214 OFFICE O/P EST MOD 30 MIN: CPT

## 2024-11-13 PROCEDURE — G2211 COMPLEX E/M VISIT ADD ON: CPT | Mod: NC

## 2024-11-14 ENCOUNTER — APPOINTMENT (OUTPATIENT)
Dept: GYNECOLOGIC ONCOLOGY | Facility: CLINIC | Age: 69
End: 2024-11-14
Payer: COMMERCIAL

## 2024-11-14 PROCEDURE — 99024 POSTOP FOLLOW-UP VISIT: CPT

## 2024-12-24 ENCOUNTER — RX RENEWAL (OUTPATIENT)
Age: 69
End: 2024-12-24

## 2025-02-25 ENCOUNTER — APPOINTMENT (OUTPATIENT)
Dept: PULMONOLOGY | Facility: CLINIC | Age: 70
End: 2025-02-25
Payer: COMMERCIAL

## 2025-02-25 VITALS
WEIGHT: 194 LBS | SYSTOLIC BLOOD PRESSURE: 112 MMHG | BODY MASS INDEX: 33.3 KG/M2 | HEART RATE: 80 BPM | RESPIRATION RATE: 14 BRPM | DIASTOLIC BLOOD PRESSURE: 64 MMHG | OXYGEN SATURATION: 99 %

## 2025-02-25 DIAGNOSIS — R09.82 POSTNASAL DRIP: ICD-10-CM

## 2025-02-25 DIAGNOSIS — R05.3 CHRONIC COUGH: ICD-10-CM

## 2025-02-25 DIAGNOSIS — G47.33 OBSTRUCTIVE SLEEP APNEA (ADULT) (PEDIATRIC): ICD-10-CM

## 2025-02-25 DIAGNOSIS — R91.1 SOLITARY PULMONARY NODULE: ICD-10-CM

## 2025-02-25 PROCEDURE — 99214 OFFICE O/P EST MOD 30 MIN: CPT

## 2025-02-25 PROCEDURE — G2211 COMPLEX E/M VISIT ADD ON: CPT | Mod: NC

## 2025-02-25 RX ORDER — AZELASTINE HYDROCHLORIDE 137 UG/1
137 SPRAY, METERED NASAL TWICE DAILY
Qty: 1 | Refills: 1 | Status: ACTIVE | COMMUNITY
Start: 2025-02-25 | End: 1900-01-01

## 2025-06-09 ENCOUNTER — APPOINTMENT (OUTPATIENT)
Dept: PULMONOLOGY | Facility: CLINIC | Age: 70
End: 2025-06-09
Payer: COMMERCIAL

## 2025-06-09 VITALS — DIASTOLIC BLOOD PRESSURE: 72 MMHG | HEART RATE: 88 BPM | OXYGEN SATURATION: 99 % | SYSTOLIC BLOOD PRESSURE: 118 MMHG

## 2025-06-09 PROCEDURE — G2211 COMPLEX E/M VISIT ADD ON: CPT | Mod: NC

## 2025-06-09 PROCEDURE — 99214 OFFICE O/P EST MOD 30 MIN: CPT

## 2025-06-10 PROBLEM — J45.909 HYPERACTIVE AIRWAY DISEASE: Status: ACTIVE | Noted: 2025-06-09

## 2025-08-13 ENCOUNTER — NON-APPOINTMENT (OUTPATIENT)
Age: 70
End: 2025-08-13